# Patient Record
Sex: MALE | Race: ASIAN | NOT HISPANIC OR LATINO | URBAN - METROPOLITAN AREA
[De-identification: names, ages, dates, MRNs, and addresses within clinical notes are randomized per-mention and may not be internally consistent; named-entity substitution may affect disease eponyms.]

---

## 2019-06-03 RX ORDER — LORATADINE 10 MG/1
1 TABLET ORAL DAILY
COMMUNITY
Start: 2015-12-07

## 2019-06-03 RX ORDER — ARIPIPRAZOLE 5 MG/1
1 TABLET ORAL DAILY
COMMUNITY
Start: 2015-12-07

## 2019-06-03 RX ORDER — FLUTICASONE PROPIONATE 50 MCG
2 SPRAY, SUSPENSION (ML) NASAL AS NEEDED
COMMUNITY
Start: 2015-12-07

## 2019-06-03 RX ORDER — CITALOPRAM 20 MG/1
1 TABLET ORAL
COMMUNITY
Start: 2015-12-07 | End: 2019-09-03

## 2019-06-03 RX ORDER — ZOLPIDEM TARTRATE 10 MG/1
10 TABLET ORAL
COMMUNITY
Start: 2015-12-07

## 2019-06-04 ENCOUNTER — OFFICE VISIT (OUTPATIENT)
Dept: FAMILY MEDICINE CLINIC | Facility: CLINIC | Age: 44
End: 2019-06-04
Payer: COMMERCIAL

## 2019-06-04 VITALS
TEMPERATURE: 97.6 F | BODY MASS INDEX: 34.58 KG/M2 | DIASTOLIC BLOOD PRESSURE: 74 MMHG | WEIGHT: 247 LBS | SYSTOLIC BLOOD PRESSURE: 128 MMHG | RESPIRATION RATE: 16 BRPM | HEIGHT: 71 IN | HEART RATE: 74 BPM | OXYGEN SATURATION: 99 %

## 2019-06-04 DIAGNOSIS — R53.83 OTHER FATIGUE: ICD-10-CM

## 2019-06-04 DIAGNOSIS — E66.3 OVERWEIGHT: ICD-10-CM

## 2019-06-04 DIAGNOSIS — E78.2 MIXED HYPERLIPIDEMIA: ICD-10-CM

## 2019-06-04 DIAGNOSIS — E55.9 VITAMIN D DEFICIENCY: ICD-10-CM

## 2019-06-04 DIAGNOSIS — R71.8 MICROCYTIC ERYTHROCYTES: Primary | ICD-10-CM

## 2019-06-04 DIAGNOSIS — E11.9 TYPE 2 DIABETES MELLITUS WITHOUT COMPLICATION, WITHOUT LONG-TERM CURRENT USE OF INSULIN (HCC): ICD-10-CM

## 2019-06-04 DIAGNOSIS — F31.9 BIPOLAR 1 DISORDER (HCC): ICD-10-CM

## 2019-06-04 DIAGNOSIS — F41.9 ANXIETY AND DEPRESSION: ICD-10-CM

## 2019-06-04 DIAGNOSIS — Z23 ENCOUNTER FOR IMMUNIZATION: Primary | ICD-10-CM

## 2019-06-04 DIAGNOSIS — F32.A ANXIETY AND DEPRESSION: ICD-10-CM

## 2019-06-04 DIAGNOSIS — Z78.9 RECENT FOREIGN TRAVEL: ICD-10-CM

## 2019-06-04 PROCEDURE — 99213 OFFICE O/P EST LOW 20 MIN: CPT | Performed by: SPECIALIST

## 2019-06-04 PROCEDURE — 90471 IMMUNIZATION ADMIN: CPT

## 2019-06-04 PROCEDURE — 1036F TOBACCO NON-USER: CPT | Performed by: SPECIALIST

## 2019-06-04 PROCEDURE — 99396 PREV VISIT EST AGE 40-64: CPT | Performed by: SPECIALIST

## 2019-06-04 PROCEDURE — 3008F BODY MASS INDEX DOCD: CPT | Performed by: SPECIALIST

## 2019-06-04 PROCEDURE — 90707 MMR VACCINE SC: CPT

## 2019-06-06 ENCOUNTER — APPOINTMENT (OUTPATIENT)
Dept: LAB | Facility: CLINIC | Age: 44
End: 2019-06-06
Payer: COMMERCIAL

## 2019-06-06 DIAGNOSIS — E78.2 MIXED HYPERLIPIDEMIA: ICD-10-CM

## 2019-06-06 DIAGNOSIS — F32.A ANXIETY AND DEPRESSION: ICD-10-CM

## 2019-06-06 DIAGNOSIS — R53.83 OTHER FATIGUE: ICD-10-CM

## 2019-06-06 DIAGNOSIS — E66.3 OVERWEIGHT: ICD-10-CM

## 2019-06-06 DIAGNOSIS — F31.9 BIPOLAR 1 DISORDER (HCC): ICD-10-CM

## 2019-06-06 DIAGNOSIS — F41.9 ANXIETY AND DEPRESSION: ICD-10-CM

## 2019-06-06 DIAGNOSIS — E55.9 VITAMIN D DEFICIENCY: ICD-10-CM

## 2019-06-06 DIAGNOSIS — R71.8 MICROCYTIC ERYTHROCYTES: ICD-10-CM

## 2019-06-06 DIAGNOSIS — E11.9 TYPE 2 DIABETES MELLITUS WITHOUT COMPLICATION, WITHOUT LONG-TERM CURRENT USE OF INSULIN (HCC): ICD-10-CM

## 2019-06-06 LAB
25(OH)D3 SERPL-MCNC: 17.8 NG/ML (ref 30–100)
ALBUMIN SERPL BCP-MCNC: 3.6 G/DL (ref 3.5–5)
ALP SERPL-CCNC: 76 U/L (ref 46–116)
ALT SERPL W P-5'-P-CCNC: 24 U/L (ref 12–78)
ANION GAP SERPL CALCULATED.3IONS-SCNC: 7 MMOL/L (ref 4–13)
AST SERPL W P-5'-P-CCNC: 13 U/L (ref 5–45)
BASOPHILS # BLD AUTO: 0.03 THOUSANDS/ΜL (ref 0–0.1)
BASOPHILS NFR BLD AUTO: 1 % (ref 0–1)
BILIRUB SERPL-MCNC: 0.63 MG/DL (ref 0.2–1)
BILIRUB UR QL STRIP: NEGATIVE
BUN SERPL-MCNC: 14 MG/DL (ref 5–25)
CALCIUM SERPL-MCNC: 8.5 MG/DL (ref 8.3–10.1)
CHLORIDE SERPL-SCNC: 103 MMOL/L (ref 100–108)
CHOLEST SERPL-MCNC: 241 MG/DL (ref 50–200)
CLARITY UR: CLEAR
CO2 SERPL-SCNC: 27 MMOL/L (ref 21–32)
COLOR UR: YELLOW
CREAT SERPL-MCNC: 1.02 MG/DL (ref 0.6–1.3)
CREAT UR-MCNC: 215 MG/DL
EOSINOPHIL # BLD AUTO: 0.39 THOUSAND/ΜL (ref 0–0.61)
EOSINOPHIL NFR BLD AUTO: 6 % (ref 0–6)
ERYTHROCYTE [DISTWIDTH] IN BLOOD BY AUTOMATED COUNT: 15.7 % (ref 11.6–15.1)
EST. AVERAGE GLUCOSE BLD GHB EST-MCNC: 157 MG/DL
GFR SERPL CREATININE-BSD FRML MDRD: 90 ML/MIN/1.73SQ M
GLUCOSE P FAST SERPL-MCNC: 144 MG/DL (ref 65–99)
GLUCOSE UR STRIP-MCNC: NEGATIVE MG/DL
HBA1C MFR BLD: 7.1 % (ref 4.2–6.3)
HCT VFR BLD AUTO: 46.1 % (ref 36.5–49.3)
HDLC SERPL-MCNC: 38 MG/DL (ref 40–60)
HGB BLD-MCNC: 14.2 G/DL (ref 12–17)
HGB UR QL STRIP.AUTO: NEGATIVE
IMM GRANULOCYTES # BLD AUTO: 0.01 THOUSAND/UL (ref 0–0.2)
IMM GRANULOCYTES NFR BLD AUTO: 0 % (ref 0–2)
KETONES UR STRIP-MCNC: NEGATIVE MG/DL
LDLC SERPL CALC-MCNC: 148 MG/DL (ref 0–100)
LEUKOCYTE ESTERASE UR QL STRIP: NEGATIVE
LYMPHOCYTES # BLD AUTO: 2.75 THOUSANDS/ΜL (ref 0.6–4.47)
LYMPHOCYTES NFR BLD AUTO: 43 % (ref 14–44)
MCH RBC QN AUTO: 24.2 PG (ref 26.8–34.3)
MCHC RBC AUTO-ENTMCNC: 30.8 G/DL (ref 31.4–37.4)
MCV RBC AUTO: 79 FL (ref 82–98)
MICROALBUMIN UR-MCNC: 10.8 MG/L (ref 0–20)
MICROALBUMIN/CREAT 24H UR: 5 MG/G CREATININE (ref 0–30)
MONOCYTES # BLD AUTO: 0.5 THOUSAND/ΜL (ref 0.17–1.22)
MONOCYTES NFR BLD AUTO: 8 % (ref 4–12)
NEUTROPHILS # BLD AUTO: 2.79 THOUSANDS/ΜL (ref 1.85–7.62)
NEUTS SEG NFR BLD AUTO: 42 % (ref 43–75)
NITRITE UR QL STRIP: NEGATIVE
NONHDLC SERPL-MCNC: 203 MG/DL
NRBC BLD AUTO-RTO: 0 /100 WBCS
PH UR STRIP.AUTO: 6 [PH]
PLATELET # BLD AUTO: 261 THOUSANDS/UL (ref 149–390)
PMV BLD AUTO: 12.5 FL (ref 8.9–12.7)
POTASSIUM SERPL-SCNC: 4.1 MMOL/L (ref 3.5–5.3)
PROT SERPL-MCNC: 6.9 G/DL (ref 6.4–8.2)
PROT UR STRIP-MCNC: NEGATIVE MG/DL
RBC # BLD AUTO: 5.87 MILLION/UL (ref 3.88–5.62)
SODIUM SERPL-SCNC: 137 MMOL/L (ref 136–145)
SP GR UR STRIP.AUTO: 1.02 (ref 1–1.03)
TRIGL SERPL-MCNC: 275 MG/DL
TSH SERPL DL<=0.05 MIU/L-ACNC: 1.08 UIU/ML (ref 0.36–3.74)
UROBILINOGEN UR QL STRIP.AUTO: 0.2 E.U./DL
WBC # BLD AUTO: 6.47 THOUSAND/UL (ref 4.31–10.16)

## 2019-06-06 PROCEDURE — 84443 ASSAY THYROID STIM HORMONE: CPT

## 2019-06-06 PROCEDURE — 83036 HEMOGLOBIN GLYCOSYLATED A1C: CPT

## 2019-06-06 PROCEDURE — 80053 COMPREHEN METABOLIC PANEL: CPT

## 2019-06-06 PROCEDURE — 82043 UR ALBUMIN QUANTITATIVE: CPT | Performed by: SPECIALIST

## 2019-06-06 PROCEDURE — 80061 LIPID PANEL: CPT

## 2019-06-06 PROCEDURE — 3061F NEG MICROALBUMINURIA REV: CPT | Performed by: SPECIALIST

## 2019-06-06 PROCEDURE — 82570 ASSAY OF URINE CREATININE: CPT | Performed by: SPECIALIST

## 2019-06-06 PROCEDURE — 36415 COLL VENOUS BLD VENIPUNCTURE: CPT

## 2019-06-06 PROCEDURE — 82306 VITAMIN D 25 HYDROXY: CPT

## 2019-06-06 PROCEDURE — 81003 URINALYSIS AUTO W/O SCOPE: CPT | Performed by: SPECIALIST

## 2019-06-06 PROCEDURE — 85025 COMPLETE CBC W/AUTO DIFF WBC: CPT

## 2019-06-17 DIAGNOSIS — E13.8 DIABETES MELLITUS OF OTHER TYPE WITH COMPLICATION, UNSPECIFIED WHETHER LONG TERM INSULIN USE: Primary | ICD-10-CM

## 2019-07-08 LAB
LEFT EYE DIABETIC RETINOPATHY: NORMAL
RIGHT EYE DIABETIC RETINOPATHY: NORMAL

## 2019-09-03 ENCOUNTER — OFFICE VISIT (OUTPATIENT)
Dept: FAMILY MEDICINE CLINIC | Facility: CLINIC | Age: 44
End: 2019-09-03
Payer: COMMERCIAL

## 2019-09-03 VITALS
DIASTOLIC BLOOD PRESSURE: 80 MMHG | HEIGHT: 71 IN | SYSTOLIC BLOOD PRESSURE: 122 MMHG | OXYGEN SATURATION: 98 % | TEMPERATURE: 98.9 F | WEIGHT: 242 LBS | HEART RATE: 71 BPM | BODY MASS INDEX: 33.88 KG/M2

## 2019-09-03 DIAGNOSIS — E55.9 VITAMIN D DEFICIENCY: ICD-10-CM

## 2019-09-03 DIAGNOSIS — R71.8 MICROCYTIC ERYTHROCYTES: ICD-10-CM

## 2019-09-03 DIAGNOSIS — Z71.84 TRAVEL ADVICE ENCOUNTER: ICD-10-CM

## 2019-09-03 DIAGNOSIS — Z71.84 COUNSELING FOR TRAVEL: Primary | ICD-10-CM

## 2019-09-03 DIAGNOSIS — E78.2 MIXED HYPERLIPIDEMIA: ICD-10-CM

## 2019-09-03 DIAGNOSIS — E13.8 DIABETES MELLITUS OF OTHER TYPE WITH COMPLICATION, UNSPECIFIED WHETHER LONG TERM INSULIN USE: ICD-10-CM

## 2019-09-03 DIAGNOSIS — R53.83 OTHER FATIGUE: ICD-10-CM

## 2019-09-03 PROCEDURE — 99214 OFFICE O/P EST MOD 30 MIN: CPT | Performed by: SPECIALIST

## 2019-09-03 PROCEDURE — 3008F BODY MASS INDEX DOCD: CPT | Performed by: SPECIALIST

## 2019-09-03 RX ORDER — ESCITALOPRAM OXALATE 20 MG/1
20 TABLET ORAL DAILY
COMMUNITY

## 2019-09-03 NOTE — PROGRESS NOTES
Assessment/Plan:    dm2   a1c  7 1%   Cont  Walking     Wt  Reduction    Increase  The  Metformin       Patient seen in office today  During the visit I was accompanied by MA while physical exam was completed  No problem-specific Assessment & Plan notes found for this encounter  Problem List Items Addressed This Visit     None            Subjective:     BMI Counseling: There is no height or weight on file to calculate BMI  Discussed the patient's BMI with him  The BMI is above average  BMI counseling and education was provided to the patient  Nutrition recommendations include decreasing overall calorie intake and increasing intake of lean protein  Depression Screening Follow-up Plan: Patient's depression screening was positive with a PHQ-2 score of   Their PHQ-9 score was   Patient assessed for underlying major depression  They have no active suicidal ideations  Brief counseling provided and recommend additional follow-up/re-evaluation next office visit  Patient ID: Laurent Wilson is a 38 y o  male      52-year-old male who feels well    diabetes type 2 A1c 7 1% average sugar 157      needs to continue weight loss and walking     he is watching the carbohydrates and is aware that he should take 70 g of carbohydrate per meal       also he is going on vacation in [facility] in [facility]    he has had hepatitis a vaccine     time recommended that he take the flu vaccine before he goes      a and I ordered oral typhoid    He tried to get appointment at [facility] travel service several weeks ago in her booked up and also [facility] is booked   precautions given for travel and he is well aware he has traveled before and he follows all the rules regarding washing fruits and vegetables and the type of food he and not to eat    I he will be receiving the flu vaccine at the pharmacy and they will dispense his oral typhoid tablets      See him in 6 months laboratory data ordered for in 6 months    Bipolar disease well controlled      The following portions of the patient's history were reviewed and updated as appropriate: allergies, current medications, past family history, past medical history, past social history, past surgical history and problem list     Review of Systems   Constitutional: Negative for activity change, appetite change, chills, diaphoresis, fatigue and fever  HENT: Negative for congestion, sinus pressure, sinus pain and voice change  Eyes: Negative for visual disturbance  Eye exam no retinopathy by Ophthalmology   Respiratory: Negative for cough, chest tightness, shortness of breath and wheezing  Cardiovascular: Negative for chest pain, palpitations and leg swelling  Gastrointestinal: Negative for abdominal distention, abdominal pain, anal bleeding and blood in stool  Genitourinary: Negative for difficulty urinating  Musculoskeletal: Negative for arthralgias, back pain, gait problem, joint swelling, myalgias, neck pain and neck stiffness  Skin: Negative for color change, pallor and rash  Neurological: Negative for dizziness, tremors, seizures, syncope, facial asymmetry, speech difficulty, weakness, light-headedness, numbness and headaches  Psychiatric/Behavioral: Negative for agitation  Objective: There were no vitals taken for this visit  Physical Exam   Constitutional: He is oriented to person, place, and time  He appears well-developed and well-nourished  No distress  HENT:   Mouth/Throat: No oropharyngeal exudate  Eyes: Pupils are equal, round, and reactive to light  EOM are normal  No scleral icterus  Neck: No JVD present  Cardiovascular: Normal rate, regular rhythm, normal heart sounds and intact distal pulses  No murmur heard  Pulmonary/Chest: Effort normal and breath sounds normal  He has no wheezes  Abdominal: Soft  Musculoskeletal: He exhibits no edema or deformity     Neurological: He is alert and oriented to person, place, and time  Skin: Skin is warm and dry  He is not diaphoretic  Psychiatric: He has a normal mood and affect

## 2019-10-04 DIAGNOSIS — E11.9 TYPE 2 DIABETES MELLITUS WITHOUT COMPLICATION, WITHOUT LONG-TERM CURRENT USE OF INSULIN (HCC): Primary | ICD-10-CM

## 2020-02-02 DIAGNOSIS — E11.9 TYPE 2 DIABETES MELLITUS WITHOUT COMPLICATION, WITHOUT LONG-TERM CURRENT USE OF INSULIN (HCC): ICD-10-CM

## 2020-02-27 DIAGNOSIS — E11.9 TYPE 2 DIABETES MELLITUS WITHOUT COMPLICATION, WITHOUT LONG-TERM CURRENT USE OF INSULIN (HCC): ICD-10-CM

## 2020-03-05 ENCOUNTER — APPOINTMENT (OUTPATIENT)
Dept: LAB | Facility: CLINIC | Age: 45
End: 2020-03-05
Payer: COMMERCIAL

## 2020-03-05 DIAGNOSIS — E55.9 VITAMIN D DEFICIENCY: ICD-10-CM

## 2020-03-05 DIAGNOSIS — E13.8 DIABETES MELLITUS OF OTHER TYPE WITH COMPLICATION, UNSPECIFIED WHETHER LONG TERM INSULIN USE: ICD-10-CM

## 2020-03-05 DIAGNOSIS — Z71.84 TRAVEL ADVICE ENCOUNTER: ICD-10-CM

## 2020-03-05 DIAGNOSIS — R71.8 MICROCYTIC ERYTHROCYTES: ICD-10-CM

## 2020-03-05 DIAGNOSIS — E78.2 MIXED HYPERLIPIDEMIA: ICD-10-CM

## 2020-03-05 LAB
25(OH)D3 SERPL-MCNC: 38.9 NG/ML (ref 30–100)
ALBUMIN SERPL BCP-MCNC: 3.8 G/DL (ref 3.5–5)
ALP SERPL-CCNC: 73 U/L (ref 46–116)
ALT SERPL W P-5'-P-CCNC: 26 U/L (ref 12–78)
ANION GAP SERPL CALCULATED.3IONS-SCNC: 1 MMOL/L (ref 4–13)
AST SERPL W P-5'-P-CCNC: 13 U/L (ref 5–45)
BASOPHILS # BLD AUTO: 0.07 THOUSANDS/ΜL (ref 0–0.1)
BASOPHILS NFR BLD AUTO: 1 % (ref 0–1)
BILIRUB SERPL-MCNC: 0.52 MG/DL (ref 0.2–1)
BILIRUB UR QL STRIP: NEGATIVE
BUN SERPL-MCNC: 13 MG/DL (ref 5–25)
CALCIUM SERPL-MCNC: 8.8 MG/DL (ref 8.3–10.1)
CHLORIDE SERPL-SCNC: 105 MMOL/L (ref 100–108)
CHOLEST SERPL-MCNC: 241 MG/DL (ref 50–200)
CLARITY UR: CLEAR
CO2 SERPL-SCNC: 29 MMOL/L (ref 21–32)
COLOR UR: YELLOW
CREAT SERPL-MCNC: 1.02 MG/DL (ref 0.6–1.3)
EOSINOPHIL # BLD AUTO: 0.32 THOUSAND/ΜL (ref 0–0.61)
EOSINOPHIL NFR BLD AUTO: 5 % (ref 0–6)
ERYTHROCYTE [DISTWIDTH] IN BLOOD BY AUTOMATED COUNT: 15.9 % (ref 11.6–15.1)
EST. AVERAGE GLUCOSE BLD GHB EST-MCNC: 148 MG/DL
GFR SERPL CREATININE-BSD FRML MDRD: 90 ML/MIN/1.73SQ M
GLUCOSE P FAST SERPL-MCNC: 115 MG/DL (ref 65–99)
GLUCOSE UR STRIP-MCNC: NEGATIVE MG/DL
HBA1C MFR BLD: 6.8 %
HCT VFR BLD AUTO: 46.4 % (ref 36.5–49.3)
HDLC SERPL-MCNC: 37 MG/DL
HGB BLD-MCNC: 14.1 G/DL (ref 12–17)
HGB UR QL STRIP.AUTO: NEGATIVE
IMM GRANULOCYTES # BLD AUTO: 0.01 THOUSAND/UL (ref 0–0.2)
IMM GRANULOCYTES NFR BLD AUTO: 0 % (ref 0–2)
KETONES UR STRIP-MCNC: NEGATIVE MG/DL
LDLC SERPL CALC-MCNC: 155 MG/DL (ref 0–100)
LEUKOCYTE ESTERASE UR QL STRIP: NEGATIVE
LYMPHOCYTES # BLD AUTO: 2.74 THOUSANDS/ΜL (ref 0.6–4.47)
LYMPHOCYTES NFR BLD AUTO: 45 % (ref 14–44)
MCH RBC QN AUTO: 23.6 PG (ref 26.8–34.3)
MCHC RBC AUTO-ENTMCNC: 30.4 G/DL (ref 31.4–37.4)
MCV RBC AUTO: 78 FL (ref 82–98)
MONOCYTES # BLD AUTO: 0.39 THOUSAND/ΜL (ref 0.17–1.22)
MONOCYTES NFR BLD AUTO: 6 % (ref 4–12)
NEUTROPHILS # BLD AUTO: 2.62 THOUSANDS/ΜL (ref 1.85–7.62)
NEUTS SEG NFR BLD AUTO: 43 % (ref 43–75)
NITRITE UR QL STRIP: NEGATIVE
NONHDLC SERPL-MCNC: 204 MG/DL
NRBC BLD AUTO-RTO: 0 /100 WBCS
PH UR STRIP.AUTO: 6 [PH]
PLATELET # BLD AUTO: 246 THOUSANDS/UL (ref 149–390)
PMV BLD AUTO: 12.1 FL (ref 8.9–12.7)
POTASSIUM SERPL-SCNC: 3.8 MMOL/L (ref 3.5–5.3)
PROT SERPL-MCNC: 7.3 G/DL (ref 6.4–8.2)
PROT UR STRIP-MCNC: NEGATIVE MG/DL
RBC # BLD AUTO: 5.97 MILLION/UL (ref 3.88–5.62)
SODIUM SERPL-SCNC: 135 MMOL/L (ref 136–145)
SP GR UR STRIP.AUTO: 1.02 (ref 1–1.03)
TRIGL SERPL-MCNC: 243 MG/DL
UROBILINOGEN UR QL STRIP.AUTO: 0.2 E.U./DL
WBC # BLD AUTO: 6.15 THOUSAND/UL (ref 4.31–10.16)

## 2020-03-05 PROCEDURE — 80061 LIPID PANEL: CPT

## 2020-03-05 PROCEDURE — 80053 COMPREHEN METABOLIC PANEL: CPT

## 2020-03-05 PROCEDURE — 83036 HEMOGLOBIN GLYCOSYLATED A1C: CPT

## 2020-03-05 PROCEDURE — 82306 VITAMIN D 25 HYDROXY: CPT

## 2020-03-05 PROCEDURE — 81003 URINALYSIS AUTO W/O SCOPE: CPT | Performed by: SPECIALIST

## 2020-03-05 PROCEDURE — 36415 COLL VENOUS BLD VENIPUNCTURE: CPT

## 2020-03-05 PROCEDURE — 3044F HG A1C LEVEL LT 7.0%: CPT | Performed by: SPECIALIST

## 2020-03-05 PROCEDURE — 85025 COMPLETE CBC W/AUTO DIFF WBC: CPT

## 2020-03-29 DIAGNOSIS — E11.9 TYPE 2 DIABETES MELLITUS WITHOUT COMPLICATION, WITHOUT LONG-TERM CURRENT USE OF INSULIN (HCC): ICD-10-CM

## 2020-04-27 DIAGNOSIS — E11.9 TYPE 2 DIABETES MELLITUS WITHOUT COMPLICATION, WITHOUT LONG-TERM CURRENT USE OF INSULIN (HCC): ICD-10-CM

## 2020-06-20 DIAGNOSIS — E11.9 TYPE 2 DIABETES MELLITUS WITHOUT COMPLICATION, WITHOUT LONG-TERM CURRENT USE OF INSULIN (HCC): ICD-10-CM

## 2020-07-16 DIAGNOSIS — E11.9 TYPE 2 DIABETES MELLITUS WITHOUT COMPLICATION, WITHOUT LONG-TERM CURRENT USE OF INSULIN (HCC): ICD-10-CM

## 2020-08-05 RX ORDER — CITALOPRAM 20 MG/1
1 TABLET ORAL
COMMUNITY
Start: 2015-12-07 | End: 2020-11-17 | Stop reason: ALTCHOICE

## 2020-08-09 DIAGNOSIS — E11.9 TYPE 2 DIABETES MELLITUS WITHOUT COMPLICATION, WITHOUT LONG-TERM CURRENT USE OF INSULIN (HCC): ICD-10-CM

## 2020-08-10 ENCOUNTER — OFFICE VISIT (OUTPATIENT)
Dept: FAMILY MEDICINE CLINIC | Facility: CLINIC | Age: 45
End: 2020-08-10
Payer: COMMERCIAL

## 2020-08-10 ENCOUNTER — TELEPHONE (OUTPATIENT)
Dept: FAMILY MEDICINE CLINIC | Facility: CLINIC | Age: 45
End: 2020-08-10

## 2020-08-10 VITALS
SYSTOLIC BLOOD PRESSURE: 144 MMHG | HEIGHT: 71 IN | OXYGEN SATURATION: 98 % | BODY MASS INDEX: 34.3 KG/M2 | DIASTOLIC BLOOD PRESSURE: 70 MMHG | HEART RATE: 65 BPM | WEIGHT: 245 LBS

## 2020-08-10 DIAGNOSIS — E78.2 MIXED HYPERLIPIDEMIA: ICD-10-CM

## 2020-08-10 DIAGNOSIS — E11.9 TYPE 2 DIABETES MELLITUS WITHOUT COMPLICATION, WITHOUT LONG-TERM CURRENT USE OF INSULIN (HCC): ICD-10-CM

## 2020-08-10 DIAGNOSIS — E55.9 VITAMIN D DEFICIENCY: ICD-10-CM

## 2020-08-10 DIAGNOSIS — F32.A ANXIETY AND DEPRESSION: ICD-10-CM

## 2020-08-10 DIAGNOSIS — F41.9 ANXIETY AND DEPRESSION: ICD-10-CM

## 2020-08-10 DIAGNOSIS — R71.8 MICROCYTIC ERYTHROCYTES: ICD-10-CM

## 2020-08-10 DIAGNOSIS — E66.3 OVERWEIGHT: ICD-10-CM

## 2020-08-10 DIAGNOSIS — E11.9 TYPE 2 DIABETES MELLITUS WITHOUT COMPLICATION, WITHOUT LONG-TERM CURRENT USE OF INSULIN (HCC): Primary | ICD-10-CM

## 2020-08-10 PROCEDURE — 99214 OFFICE O/P EST MOD 30 MIN: CPT | Performed by: SPECIALIST

## 2020-08-10 PROCEDURE — 3725F SCREEN DEPRESSION PERFORMED: CPT | Performed by: SPECIALIST

## 2020-08-10 PROCEDURE — 2022F DILAT RTA XM EVC RTNOPTHY: CPT | Performed by: SPECIALIST

## 2020-08-10 PROCEDURE — 3008F BODY MASS INDEX DOCD: CPT | Performed by: SPECIALIST

## 2020-08-10 PROCEDURE — 3044F HG A1C LEVEL LT 7.0%: CPT | Performed by: SPECIALIST

## 2020-08-10 PROCEDURE — 1036F TOBACCO NON-USER: CPT | Performed by: SPECIALIST

## 2020-08-10 RX ORDER — FLASH GLUCOSE SCANNING READER
EACH MISCELLANEOUS
Qty: 1 DEVICE | Refills: 0 | Status: SHIPPED | OUTPATIENT
Start: 2020-08-10 | End: 2022-02-10

## 2020-08-10 RX ORDER — FLASH GLUCOSE SENSOR
1 KIT MISCELLANEOUS
Qty: 4 EACH | Refills: 6 | Status: SHIPPED | OUTPATIENT
Start: 2020-08-10 | End: 2020-08-10

## 2020-08-10 RX ORDER — FLASH GLUCOSE SENSOR
1 KIT MISCELLANEOUS
Qty: 1 EACH | Refills: 6 | Status: SHIPPED | OUTPATIENT
Start: 2020-08-10 | End: 2022-02-10

## 2020-08-10 RX ORDER — ROSUVASTATIN CALCIUM 5 MG/1
5 TABLET, COATED ORAL DAILY
Qty: 90 TABLET | Refills: 3 | Status: SHIPPED | OUTPATIENT
Start: 2020-08-10 | End: 2021-07-13

## 2020-08-10 NOTE — PROGRESS NOTES
Assessment/Plan:      43-year-old male with diabetes mellitus type 2 hyperlipidemia had blood work done there has been improvement in his A1c now  6 8  %    average sugar 148 fasting blood sugar was 115 so I suspect that the 2 hour postprandial sugars are too high I recommend Aleve peripheries for continuous monitoring which will also help with his diet at to tele how sugars are before and after eating      Referred to diabetic Education    Continue using the treadmill 3 times a week      He has history of vitamin-D deficiency was 17 8 corrected to 38 5    He has hyperlipidemia Crestor 5 mg added at bedtime cholesterol was 241 triglyceride 243 L DL was 155      Otherwise stable he does need flu vaccine in the fall and he has schedule does at the pharmacy with a secondary follow-up for hepatitis a vaccine      Patient seen in office today  During the visit I was accompanied by MA while physical exam was completed  No problem-specific Assessment & Plan notes found for this encounter           Problem List Items Addressed This Visit     None      Visit Diagnoses     Type 2 diabetes mellitus without complication, without long-term current use of insulin (HCC)    -  Primary    Relevant Medications    Continuous Blood Gluc Sensor (FreeStyle Geraldine 14 Day Sensor) MISC    Continuous Blood Gluc  (FreeStyle Geraldine 14 Day Wilmot) TINO    Other Relevant Orders    CBC and differential    Comprehensive metabolic panel    HEMOGLOBIN A1C W/ EAG ESTIMATION    Lipid panel    Magnesium    Microalbumin / creatinine urine ratio    UA w Reflex to Microscopic w Reflex to Culture -Lab Collect    Ambulatory referral to Diabetic Education    Microcytic erythrocytes        Relevant Orders    CBC and differential    Comprehensive metabolic panel    HEMOGLOBIN A1C W/ EAG ESTIMATION    Lipid panel    Magnesium    Microalbumin / creatinine urine ratio    UA w Reflex to Microscopic w Reflex to Culture -Lab Collect    Mixed hyperlipidemia Relevant Medications    rosuvastatin (CRESTOR) 5 mg tablet    Continuous Blood Gluc Sensor (FreeStyle Geraldine 14 Day Sensor) MISC    Continuous Blood Gluc  (FreeStyle Hodges 14 Day Converse) TINO    Other Relevant Orders    CBC and differential    Comprehensive metabolic panel    HEMOGLOBIN A1C W/ EAG ESTIMATION    Lipid panel    Magnesium    Microalbumin / creatinine urine ratio    UA w Reflex to Microscopic w Reflex to Culture -Lab Collect    Ambulatory referral to Diabetic Education    Vitamin D deficiency        Relevant Orders    CBC and differential    Comprehensive metabolic panel    HEMOGLOBIN A1C W/ EAG ESTIMATION    Lipid panel    Magnesium    Microalbumin / creatinine urine ratio    UA w Reflex to Microscopic w Reflex to Culture -Lab Collect    Overweight        Relevant Medications    Continuous Blood Gluc Sensor (FreeStyle Geraldine 14 Day Sensor) MISC    Continuous Blood Gluc  (FreeStyle Hodges 14 Day Converse) TINO    Other Relevant Orders    CBC and differential    Comprehensive metabolic panel    HEMOGLOBIN A1C W/ EAG ESTIMATION    Lipid panel    Magnesium    Microalbumin / creatinine urine ratio    UA w Reflex to Microscopic w Reflex to Culture -Lab Collect    Anxiety and depression        Relevant Orders    CBC and differential    Comprehensive metabolic panel    HEMOGLOBIN A1C W/ EAG ESTIMATION    Lipid panel    Magnesium    Microalbumin / creatinine urine ratio    UA w Reflex to Microscopic w Reflex to Culture -Lab Collect            Subjective:            Patient ID: Isaias De Luna is a 41 y o  male      60-year-old male with diabetes mellitus hyperlipidemia referring him to diabetic Education in dietitian I am adding Crestor 5 mg at bedtime since his cholesterol was fairly elevated at 241 and he has been using the treadmill 3 times a week also thinks that he would do very well with a continuous glucose monitor to help him with his diet and exercise so he can see how his sugars respond to different types of meals      He with he will be getting flu vaccine in the fall as well as hepatitis a vaccine a 2nd dose      The following portions of the patient's history were reviewed and updated as appropriate: allergies, past family history, past medical history and past surgical history  Review of Systems   Constitutional: Negative for activity change, appetite change, chills, diaphoresis, fatigue and fever  HENT: Negative for congestion, sinus pressure, sinus pain and voice change  Eyes: Negative for visual disturbance  Has seen the eye doctor no retinal disease   Respiratory: Negative for chest tightness, shortness of breath, wheezing and stridor  Has seasonal allergies nonsmoker   Cardiovascular: Negative for chest pain, palpitations and leg swelling  Gastrointestinal: Negative for abdominal distention  Genitourinary: Negative for difficulty urinating  Musculoskeletal: Positive for arthralgias  Negative for back pain, gait problem, joint swelling, myalgias, neck pain and neck stiffness  Skin: Negative  Neurological: Negative for dizziness, tremors, seizures, syncope, facial asymmetry, speech difficulty, weakness, light-headedness, numbness and headaches  Objective:      /70 (BP Location: Right arm, Patient Position: Sitting, Cuff Size: Standard)   Pulse 65   Ht 5' 11" (1 803 m)   Wt 111 kg (245 lb)   SpO2 98%   BMI 34 17 kg/m²          Physical Exam  Constitutional:       Appearance: Normal appearance  Comments: Overweight   Eyes:      General: No scleral icterus  Extraocular Movements: Extraocular movements intact  Pupils: Pupils are equal, round, and reactive to light  Neck:      Musculoskeletal: Normal range of motion  Cardiovascular:      Rate and Rhythm: Normal rate and regular rhythm  Pulses: Normal pulses  Heart sounds: Normal heart sounds  No murmur     Pulmonary:      Effort: Pulmonary effort is normal  No respiratory distress  Breath sounds: No wheezing or rales  Abdominal:      General: Abdomen is flat  Palpations: Abdomen is soft  Skin:     General: Skin is warm and dry  Neurological:      General: No focal deficit present  Mental Status: He is alert     Psychiatric:         Mood and Affect: Mood normal

## 2020-08-10 NOTE — PROGRESS NOTES
Diabetic Foot Exam    Diabetic Foot Exam     He has normal vibratory normal pulses skin is intact vibratory extinction is normal light touch is normal no evidence of peripheral neuropathy or vascular insufficiency

## 2020-08-11 ENCOUNTER — TELEMEDICINE (OUTPATIENT)
Dept: DIABETES SERVICES | Facility: HOSPITAL | Age: 45
End: 2020-08-11
Payer: COMMERCIAL

## 2020-08-11 VITALS — WEIGHT: 245 LBS | BODY MASS INDEX: 34.3 KG/M2 | HEIGHT: 71 IN

## 2020-08-11 DIAGNOSIS — E11.9 TYPE 2 DIABETES MELLITUS WITHOUT COMPLICATION, WITHOUT LONG-TERM CURRENT USE OF INSULIN (HCC): Primary | ICD-10-CM

## 2020-08-11 PROCEDURE — 3008F BODY MASS INDEX DOCD: CPT | Performed by: SPECIALIST

## 2020-08-11 PROCEDURE — G0108 DIAB MANAGE TRN  PER INDIV: HCPCS | Performed by: DIETITIAN, REGISTERED

## 2020-08-11 NOTE — PROGRESS NOTES
Virtual Regular Visit      Assessment/Plan:    Problem List Items Addressed This Visit     None               Reason for visit is   Chief Complaint   Patient presents with    Diabetes Type 2    Virtual Regular Visit        Encounter provider Dilma Busby RD    Provider located at [place] [address] [address] [place] [address]      Recent Visits  Date Type Provider Dept   08/10/20 [facility]   08/10/20 Office Visit Maya Nguyễn MD Pg  Primary [facility]   Showing recent visits within past 7 days and meeting all other requirements     Today's Visits  Date Type Provider Dept   08/11/20 Telemedicine Dilma Busby RD Pg Diabetic Education [place] today's visits and meeting all other requirements     Future Appointments  No visits were found meeting these conditions  Showing future appointments within next 150 days and meeting all other requirements        The patient was identified by name and date of birth  Yesi Morales was informed that this is a telemedicine visit and that the visit is being conducted through [employer]  My office door was closed  No one else was in the room  He acknowledged consent and understanding of privacy and security of the video platform  The patient has agreed to participate and understands they can discontinue the visit at any time  Patient is aware this is a billable service  Type 2 Diabetes Class Assessment    Met with Yesi Morales for DSME Follow-up visit  Telly has Type 2 Diabetes  Referral received for diabetes classes and medical nutrition therapy, he would like to learn as much as he can about managing his diabetes I would like to attend our classes, likely complete medical to nutrition therapy afterwards  Diabetes assessment completed today    I encouraged him to reach out to his family doctor to get a prescription for a meter and testing supplies as this is the foundation of diabetes management, he will do so  I asked him to start testing twice a day as pair testing  He will call us to schedule virtual classes  Diabetes Assessment  Visit Type: Initial visit  Present at Session: patient   Medical Diagnosis/ICD 10: E11 9  Special Learning Needs: No  Barriers to Learning: no barriers    How do you feel about making lifestyle changes at this time? ready  How would you rate your current knowledge of diabetes?: fair  How confident are you that will be able to take better control of your diabetes?: good    How long have you had diabetes? New dx   Have you had diabetes education in the past?: No  Do you have any family members with diabetes?: No  Do you monitor your blood sugar? no    Any financial concerns pertaining to your diabetes supplies, medication or care?: No  Have you ever experienced hypoglycemia?:  Yes  Have you ever been hospitalized or gone to the ER due to your blood sugars?: No  How do you treat low blood sugars?: drink something sweet or juice , once felt low   How do you treat high blood sugars?  Eat a cracker  Do you wear a Diabetes I D ?: no  Where do you dispose of your sharps (needles,lancetes)?: none    Ht Readings from Last 1 Encounters:   08/11/20 5' 11" (1 803 m)     Wt Readings from Last 2 Encounters:   08/11/20 111 kg (245 lb)   08/10/20 111 kg (245 lb)     Weight Change: No    Diet Assessment  Do you follow any special diet presently?: Yes - decreasing carbs  Who cooks at home?:  patient  Who does the grocery shopping?: patient     Activity Assessment  Exercise: starting to use the treadmill and work out more 3 5miles per hour for 30-40min     Lifestyle/Social Assessment    Racial/ethnic group:                                       Primary Language: English  Marital Status: Single  Work status: Unemployed  Type of job and hours: out of work  Who lives in your household?: parent(s)  Who is you primary support person(s): parent(s)   Describe your quality of life currently?: good  Any concerns for your safety?: No  Any Amish or cultural practices that may affect your diabetes care: No  Do you have a decrease or loss of hearing: No  Do you have a decrease or loss of vision?: No has macular degeneration for age, had a dm exam 2019  When was the last time you had an ophthalmology exam?: 2019  When was the last time you had dental exam?:  Dentist over due from covid 2019 fall   Do you check your feet for cracks, sores, debris?: No  When was the last time you had podiatry or foot exam?: pcp looked at them  Last flu shot?: sept 2019  Pneumonia shot?: none       Lab Results   Component Value Date    HGBA1C 6 8 (H) 03/05/2020     Lab Results   Component Value Date    HDL 37 (L) 03/05/2020    [address] 155 (H) 03/05/2020    TRIG 243 (H) 03/05/2020     No results found for: [facility]      The patient's history was reviewed and updated as appropriate: allergies, current medications  Intervention    Diabetes Overview :   Katherne Landau was instructed on basic concepts of diabetes, including identifying role of diabetes self management, basic pathophysiology and types of diabetes, A1c and blood sugar targets  Telly has good understanding of material covered  Taking Medications: Instructed patient on action, side effects, efficacy, prescribed dosage and appropriate timing and frequency of administration of his diabetes medication  Telly has good understanding of material covered  Monitoring Blood Sugars  Instructions for Meter Teaching- Patient instructed in the following:  Site selection and skin preparation, Loading strips and lancet device, meter activation, obtaining blood sample, test strip and lancet disposal and recording log book entries  Patient has good understanding of material covered    Testing frequency: Encouraged pair testing   Test sugars before a meal and 2hr after the same meal, rotating between breakfast, lunch, and dinner  Test sugars twice a day (3 days a week or 7 days a week)  Goal Blood Sugars:   Premeal , even better <110  2hr after a meal <180, even better <140  A1C <7%, even better <6 5%  Hypoglycemia: Instructed patient on definition/risk of hypoglycemia, treatment, causes/symptoms, when to notify provider of lows, prevention of hypoglycemia and exercise precautions  Comments: Telly verbalizes understanding of hypoglycemia concepts      Physical Activity: Discussed benefits of physical activity to optimize blood glucose control, encouraged activity at patient is physically able  Always consult a physician prior to starting an exercise program   Comments: Telly verbalizes understanding of hypoglycemia concepts          Diabetes Education Record    Telly was given our assessment packet, which includes: Know Your Numbers, Support Group Schedule, Hypoglycemia Sheet, Blood Sugar Log Record Sheet, Sharps Disposal, and Nutrition Guidelines for Diabetes  - sent via mail      Patient response to instruction    Comprehensiongood  Motivationgood  Expected Compliancegood  Referring Provider: Rosemary Markham    Thank you for referring your patient to [facility], it was a pleasure working with them today  Please feel free to call with any questions or concerns  Dilip Sidhu, RD  [address] [phone]      Subjective  Spring Terrell is a 41 y o  male see notes above   HPI     Past Medical History:   Diagnosis Date    Allergic     Anxiety     Bipolar affective disorder [facility])     sees Dr Michelle Landis Depression        No past surgical history on file      Current Outpatient Medications   Medication Sig Dispense Refill    metFORMIN (GLUCOPHAGE) 500 mg tablet TAKE 1 TABLET BY MOUTH TWICE A DAY WITH MEALS (Patient taking differently: One with breakfast, 2 with dinner) 60 tablet 0    ARIPiprazole (ABILIFY) 5 mg tablet Take 1 tablet by mouth daily  citalopram (CeleXA) 20 mg tablet 1 tablet      Continuous Blood Gluc  (FreeStyle Geraldine 14 Day Electra) TINO Use  Daily  To  Monitor  sugars 1 Device 0    Continuous Blood Gluc Sensor (FreeStyle Geraldine 14 Day Sensor) MISC 1 PATCH BY DOES NOT APPLY ROUTE EVERY 14 (FOURTEEN) DAYS 1 each 6    escitalopram (LEXAPRO) 20 mg tablet Take 20 mg by mouth daily      fluticasone (FLONASE) 50 mcg/act nasal spray 2 sprays into each nostril daily       loratadine (CLARITIN) 10 mg tablet Take 1 tablet by mouth daily       rosuvastatin (CRESTOR) 5 mg tablet Take 1 tablet (5 mg total) by mouth daily 90 tablet 3    typhoid live vaccine (VIVOTIF) DR capsule Take one tab PO day 0, 2, 4 & day 6 4 capsule 0    zolpidem (AMBIEN) 10 mg tablet Take 10 mg by mouth daily at bedtime        No current facility-administered medications for this visit  Allergies   Allergen Reactions    No Known Allergies        Review of Systems    Video Exam    Vitals:    08/11/20 1310   Weight: 111 kg (245 lb)   Height: 5' 11" (1 803 m)       Physical Exam     I spent 60 minutes with patient today in which greater than 50% of the time was spent in counseling/coordination of care regarding diabetes      VIRTUAL VISIT DISCLAIMER    Ashish Leeanne Sacks acknowledges that he has consented to an online visit or consultation  He understands that the online visit is based solely on information provided by him, and that, in the absence of a face-to-face physical evaluation by the physician, the diagnosis he receives is both limited and provisional in terms of accuracy and completeness  This is not intended to replace a full medical face-to-face evaluation by the physician  Katie Segal understands and accepts these terms

## 2020-08-12 ENCOUNTER — TELEPHONE (OUTPATIENT)
Dept: FAMILY MEDICINE CLINIC | Facility: CLINIC | Age: 45
End: 2020-08-12

## 2020-08-12 DIAGNOSIS — E11.9 TYPE 2 DIABETES MELLITUS WITHOUT COMPLICATION, WITHOUT LONG-TERM CURRENT USE OF INSULIN (HCC): Primary | ICD-10-CM

## 2020-08-12 NOTE — TELEPHONE ENCOUNTER
Patient called and stated that the [place] isn't covered by his insurance  He was told that he should get a Glucose meter and test strips to test his blood 2 times daily    [facility] [address] [facility]

## 2020-08-14 ENCOUNTER — TELEPHONE (OUTPATIENT)
Dept: FAMILY MEDICINE CLINIC | Facility: CLINIC | Age: 45
End: 2020-08-14

## 2020-08-14 DIAGNOSIS — E11.9 TYPE 2 DIABETES MELLITUS WITHOUT COMPLICATION, WITHOUT LONG-TERM CURRENT USE OF INSULIN (HCC): Primary | ICD-10-CM

## 2020-08-17 RX ORDER — BLOOD-GLUCOSE METER
EACH MISCELLANEOUS DAILY
Qty: 1 EACH | Refills: 0 | Status: SHIPPED | OUTPATIENT
Start: 2020-08-17

## 2020-08-21 DIAGNOSIS — E11.9 TYPE 2 DIABETES MELLITUS WITHOUT COMPLICATION, WITHOUT LONG-TERM CURRENT USE OF INSULIN (HCC): ICD-10-CM

## 2020-10-07 ENCOUNTER — TELEMEDICINE (OUTPATIENT)
Dept: DIABETES SERVICES | Facility: CLINIC | Age: 45
End: 2020-10-07
Payer: COMMERCIAL

## 2020-10-07 DIAGNOSIS — E11.9 TYPE 2 DIABETES MELLITUS WITHOUT COMPLICATION, WITHOUT LONG-TERM CURRENT USE OF INSULIN (HCC): Primary | ICD-10-CM

## 2020-10-07 PROCEDURE — G0109 DIAB MANAGE TRN IND/GROUP: HCPCS | Performed by: DIETITIAN, REGISTERED

## 2020-10-09 RX ORDER — CETIRIZINE HYDROCHLORIDE 5 MG/1
5 TABLET ORAL DAILY
COMMUNITY
Start: 2020-03-01

## 2020-10-14 ENCOUNTER — TELEMEDICINE (OUTPATIENT)
Dept: DIABETES SERVICES | Facility: CLINIC | Age: 45
End: 2020-10-14
Payer: COMMERCIAL

## 2020-10-14 DIAGNOSIS — E11.9 TYPE 2 DIABETES MELLITUS WITHOUT COMPLICATION, WITHOUT LONG-TERM CURRENT USE OF INSULIN (HCC): Primary | ICD-10-CM

## 2020-10-14 PROCEDURE — G0109 DIAB MANAGE TRN IND/GROUP: HCPCS | Performed by: DIETITIAN, REGISTERED

## 2020-10-18 DIAGNOSIS — E11.9 TYPE 2 DIABETES MELLITUS WITHOUT COMPLICATION, WITHOUT LONG-TERM CURRENT USE OF INSULIN (HCC): ICD-10-CM

## 2020-10-21 ENCOUNTER — TELEMEDICINE (OUTPATIENT)
Dept: DIABETES SERVICES | Facility: CLINIC | Age: 45
End: 2020-10-21
Payer: COMMERCIAL

## 2020-10-21 DIAGNOSIS — E11.9 TYPE 2 DIABETES MELLITUS WITHOUT COMPLICATION, WITHOUT LONG-TERM CURRENT USE OF INSULIN (HCC): Primary | ICD-10-CM

## 2020-10-21 PROCEDURE — G0109 DIAB MANAGE TRN IND/GROUP: HCPCS | Performed by: DIETITIAN, REGISTERED

## 2020-10-28 ENCOUNTER — TELEMEDICINE (OUTPATIENT)
Dept: DIABETES SERVICES | Facility: CLINIC | Age: 45
End: 2020-10-28
Payer: COMMERCIAL

## 2020-10-28 DIAGNOSIS — E11.9 TYPE 2 DIABETES MELLITUS WITHOUT COMPLICATION, WITHOUT LONG-TERM CURRENT USE OF INSULIN (HCC): Primary | ICD-10-CM

## 2020-10-28 PROCEDURE — G0109 DIAB MANAGE TRN IND/GROUP: HCPCS | Performed by: DIETITIAN, REGISTERED

## 2020-11-09 ENCOUNTER — APPOINTMENT (OUTPATIENT)
Dept: LAB | Facility: CLINIC | Age: 45
End: 2020-11-09
Payer: COMMERCIAL

## 2020-11-09 DIAGNOSIS — E11.9 TYPE 2 DIABETES MELLITUS WITHOUT COMPLICATION, WITHOUT LONG-TERM CURRENT USE OF INSULIN (HCC): ICD-10-CM

## 2020-11-09 DIAGNOSIS — E55.9 VITAMIN D DEFICIENCY: ICD-10-CM

## 2020-11-09 DIAGNOSIS — R71.8 MICROCYTIC ERYTHROCYTES: ICD-10-CM

## 2020-11-09 DIAGNOSIS — F41.9 ANXIETY AND DEPRESSION: ICD-10-CM

## 2020-11-09 DIAGNOSIS — F32.A ANXIETY AND DEPRESSION: ICD-10-CM

## 2020-11-09 DIAGNOSIS — E66.3 OVERWEIGHT: ICD-10-CM

## 2020-11-09 DIAGNOSIS — E78.2 MIXED HYPERLIPIDEMIA: ICD-10-CM

## 2020-11-09 LAB
ALBUMIN SERPL BCP-MCNC: 3.8 G/DL (ref 3.5–5)
ALP SERPL-CCNC: 72 U/L (ref 46–116)
ALT SERPL W P-5'-P-CCNC: 33 U/L (ref 12–78)
ANION GAP SERPL CALCULATED.3IONS-SCNC: 4 MMOL/L (ref 4–13)
AST SERPL W P-5'-P-CCNC: 15 U/L (ref 5–45)
BASOPHILS # BLD AUTO: 0.07 THOUSANDS/ΜL (ref 0–0.1)
BASOPHILS NFR BLD AUTO: 1 % (ref 0–1)
BILIRUB SERPL-MCNC: 0.6 MG/DL (ref 0.2–1)
BILIRUB UR QL STRIP: NEGATIVE
BUN SERPL-MCNC: 9 MG/DL (ref 5–25)
CALCIUM SERPL-MCNC: 9.2 MG/DL (ref 8.3–10.1)
CHLORIDE SERPL-SCNC: 104 MMOL/L (ref 100–108)
CHOLEST SERPL-MCNC: 161 MG/DL (ref 50–200)
CLARITY UR: CLEAR
CO2 SERPL-SCNC: 29 MMOL/L (ref 21–32)
COLOR UR: YELLOW
CREAT SERPL-MCNC: 0.88 MG/DL (ref 0.6–1.3)
CREAT UR-MCNC: 136 MG/DL
EOSINOPHIL # BLD AUTO: 0.37 THOUSAND/ΜL (ref 0–0.61)
EOSINOPHIL NFR BLD AUTO: 6 % (ref 0–6)
ERYTHROCYTE [DISTWIDTH] IN BLOOD BY AUTOMATED COUNT: 16 % (ref 11.6–15.1)
EST. AVERAGE GLUCOSE BLD GHB EST-MCNC: 143 MG/DL
GFR SERPL CREATININE-BSD FRML MDRD: 104 ML/MIN/1.73SQ M
GLUCOSE P FAST SERPL-MCNC: 119 MG/DL (ref 65–99)
GLUCOSE UR STRIP-MCNC: NEGATIVE MG/DL
HBA1C MFR BLD: 6.6 %
HCT VFR BLD AUTO: 46.9 % (ref 36.5–49.3)
HDLC SERPL-MCNC: 47 MG/DL
HGB BLD-MCNC: 14.3 G/DL (ref 12–17)
HGB UR QL STRIP.AUTO: NEGATIVE
IMM GRANULOCYTES # BLD AUTO: 0.01 THOUSAND/UL (ref 0–0.2)
IMM GRANULOCYTES NFR BLD AUTO: 0 % (ref 0–2)
KETONES UR STRIP-MCNC: NEGATIVE MG/DL
LDLC SERPL CALC-MCNC: 77 MG/DL (ref 0–100)
LEUKOCYTE ESTERASE UR QL STRIP: NEGATIVE
LYMPHOCYTES # BLD AUTO: 2.16 THOUSANDS/ΜL (ref 0.6–4.47)
LYMPHOCYTES NFR BLD AUTO: 35 % (ref 14–44)
MAGNESIUM SERPL-MCNC: 2.2 MG/DL (ref 1.6–2.6)
MCH RBC QN AUTO: 24.4 PG (ref 26.8–34.3)
MCHC RBC AUTO-ENTMCNC: 30.5 G/DL (ref 31.4–37.4)
MCV RBC AUTO: 80 FL (ref 82–98)
MICROALBUMIN UR-MCNC: 7.6 MG/L (ref 0–20)
MICROALBUMIN/CREAT 24H UR: 6 MG/G CREATININE (ref 0–30)
MONOCYTES # BLD AUTO: 0.41 THOUSAND/ΜL (ref 0.17–1.22)
MONOCYTES NFR BLD AUTO: 7 % (ref 4–12)
NEUTROPHILS # BLD AUTO: 3.2 THOUSANDS/ΜL (ref 1.85–7.62)
NEUTS SEG NFR BLD AUTO: 51 % (ref 43–75)
NITRITE UR QL STRIP: NEGATIVE
NONHDLC SERPL-MCNC: 114 MG/DL
NRBC BLD AUTO-RTO: 0 /100 WBCS
PH UR STRIP.AUTO: 6 [PH]
PLATELET # BLD AUTO: 249 THOUSANDS/UL (ref 149–390)
PMV BLD AUTO: 11.5 FL (ref 8.9–12.7)
POTASSIUM SERPL-SCNC: 4 MMOL/L (ref 3.5–5.3)
PROT SERPL-MCNC: 7.1 G/DL (ref 6.4–8.2)
PROT UR STRIP-MCNC: NEGATIVE MG/DL
RBC # BLD AUTO: 5.85 MILLION/UL (ref 3.88–5.62)
SODIUM SERPL-SCNC: 137 MMOL/L (ref 136–145)
SP GR UR STRIP.AUTO: 1.02 (ref 1–1.03)
TRIGL SERPL-MCNC: 183 MG/DL
UROBILINOGEN UR QL STRIP.AUTO: 0.2 E.U./DL
WBC # BLD AUTO: 6.22 THOUSAND/UL (ref 4.31–10.16)

## 2020-11-09 PROCEDURE — 82043 UR ALBUMIN QUANTITATIVE: CPT | Performed by: SPECIALIST

## 2020-11-09 PROCEDURE — 80061 LIPID PANEL: CPT

## 2020-11-09 PROCEDURE — 83036 HEMOGLOBIN GLYCOSYLATED A1C: CPT

## 2020-11-09 PROCEDURE — 83735 ASSAY OF MAGNESIUM: CPT

## 2020-11-09 PROCEDURE — 85025 COMPLETE CBC W/AUTO DIFF WBC: CPT

## 2020-11-09 PROCEDURE — 3044F HG A1C LEVEL LT 7.0%: CPT | Performed by: INTERNAL MEDICINE

## 2020-11-09 PROCEDURE — 3061F NEG MICROALBUMINURIA REV: CPT | Performed by: INTERNAL MEDICINE

## 2020-11-09 PROCEDURE — 36415 COLL VENOUS BLD VENIPUNCTURE: CPT

## 2020-11-09 PROCEDURE — 80053 COMPREHEN METABOLIC PANEL: CPT

## 2020-11-09 PROCEDURE — 82570 ASSAY OF URINE CREATININE: CPT | Performed by: SPECIALIST

## 2020-11-09 PROCEDURE — 81003 URINALYSIS AUTO W/O SCOPE: CPT | Performed by: SPECIALIST

## 2020-11-17 ENCOUNTER — OFFICE VISIT (OUTPATIENT)
Dept: FAMILY MEDICINE CLINIC | Facility: CLINIC | Age: 45
End: 2020-11-17
Payer: COMMERCIAL

## 2020-11-17 VITALS
SYSTOLIC BLOOD PRESSURE: 140 MMHG | WEIGHT: 246 LBS | HEIGHT: 71 IN | HEART RATE: 88 BPM | TEMPERATURE: 98 F | BODY MASS INDEX: 34.44 KG/M2 | OXYGEN SATURATION: 96 % | DIASTOLIC BLOOD PRESSURE: 92 MMHG

## 2020-11-17 DIAGNOSIS — E11.9 TYPE 2 DIABETES MELLITUS TREATED WITHOUT INSULIN (HCC): Primary | ICD-10-CM

## 2020-11-17 DIAGNOSIS — D56.3 THALASSEMIA TRAIT: ICD-10-CM

## 2020-11-17 DIAGNOSIS — E78.00 PURE HYPERCHOLESTEROLEMIA: ICD-10-CM

## 2020-11-17 DIAGNOSIS — F31.31 BIPOLAR AFFECTIVE DISORDER, CURRENTLY DEPRESSED, MILD (HCC): ICD-10-CM

## 2020-11-17 PROCEDURE — 3008F BODY MASS INDEX DOCD: CPT | Performed by: INTERNAL MEDICINE

## 2020-11-17 PROCEDURE — 1036F TOBACCO NON-USER: CPT | Performed by: INTERNAL MEDICINE

## 2020-11-17 PROCEDURE — 99396 PREV VISIT EST AGE 40-64: CPT | Performed by: INTERNAL MEDICINE

## 2020-11-17 PROCEDURE — 99214 OFFICE O/P EST MOD 30 MIN: CPT | Performed by: INTERNAL MEDICINE

## 2020-12-17 DIAGNOSIS — E11.9 TYPE 2 DIABETES MELLITUS WITHOUT COMPLICATION, WITHOUT LONG-TERM CURRENT USE OF INSULIN (HCC): ICD-10-CM

## 2021-03-10 DIAGNOSIS — Z23 ENCOUNTER FOR IMMUNIZATION: ICD-10-CM

## 2021-05-09 DIAGNOSIS — E11.9 TYPE 2 DIABETES MELLITUS WITHOUT COMPLICATION, WITHOUT LONG-TERM CURRENT USE OF INSULIN (HCC): ICD-10-CM

## 2021-06-09 ENCOUNTER — APPOINTMENT (OUTPATIENT)
Dept: LAB | Facility: CLINIC | Age: 46
End: 2021-06-09
Payer: COMMERCIAL

## 2021-06-09 DIAGNOSIS — E78.00 PURE HYPERCHOLESTEROLEMIA: ICD-10-CM

## 2021-06-09 DIAGNOSIS — D56.3 THALASSEMIA TRAIT: ICD-10-CM

## 2021-06-09 DIAGNOSIS — E11.9 TYPE 2 DIABETES MELLITUS TREATED WITHOUT INSULIN (HCC): ICD-10-CM

## 2021-06-09 LAB
ALBUMIN SERPL BCP-MCNC: 3.7 G/DL (ref 3.5–5)
ALP SERPL-CCNC: 74 U/L (ref 46–116)
ALT SERPL W P-5'-P-CCNC: 32 U/L (ref 12–78)
ANION GAP SERPL CALCULATED.3IONS-SCNC: 5 MMOL/L (ref 4–13)
AST SERPL W P-5'-P-CCNC: 14 U/L (ref 5–45)
BASOPHILS # BLD AUTO: 0.06 THOUSANDS/ΜL (ref 0–0.1)
BASOPHILS NFR BLD AUTO: 1 % (ref 0–1)
BILIRUB SERPL-MCNC: 0.68 MG/DL (ref 0.2–1)
BUN SERPL-MCNC: 15 MG/DL (ref 5–25)
CALCIUM SERPL-MCNC: 9 MG/DL (ref 8.3–10.1)
CHLORIDE SERPL-SCNC: 104 MMOL/L (ref 100–108)
CHOLEST SERPL-MCNC: 179 MG/DL (ref 50–200)
CO2 SERPL-SCNC: 28 MMOL/L (ref 21–32)
CREAT SERPL-MCNC: 0.92 MG/DL (ref 0.6–1.3)
EOSINOPHIL # BLD AUTO: 0.4 THOUSAND/ΜL (ref 0–0.61)
EOSINOPHIL NFR BLD AUTO: 6 % (ref 0–6)
ERYTHROCYTE [DISTWIDTH] IN BLOOD BY AUTOMATED COUNT: 16.1 % (ref 11.6–15.1)
EST. AVERAGE GLUCOSE BLD GHB EST-MCNC: 137 MG/DL
GFR SERPL CREATININE-BSD FRML MDRD: 100 ML/MIN/1.73SQ M
GLUCOSE P FAST SERPL-MCNC: 120 MG/DL (ref 65–99)
HBA1C MFR BLD: 6.4 %
HCT VFR BLD AUTO: 47.6 % (ref 36.5–49.3)
HDLC SERPL-MCNC: 45 MG/DL
HGB BLD-MCNC: 14.5 G/DL (ref 12–17)
IMM GRANULOCYTES # BLD AUTO: 0.02 THOUSAND/UL (ref 0–0.2)
IMM GRANULOCYTES NFR BLD AUTO: 0 % (ref 0–2)
LDLC SERPL CALC-MCNC: 88 MG/DL (ref 0–100)
LDLC SERPL DIRECT ASSAY-MCNC: 109 MG/DL (ref 0–100)
LYMPHOCYTES # BLD AUTO: 2.92 THOUSANDS/ΜL (ref 0.6–4.47)
LYMPHOCYTES NFR BLD AUTO: 41 % (ref 14–44)
MCH RBC QN AUTO: 24.1 PG (ref 26.8–34.3)
MCHC RBC AUTO-ENTMCNC: 30.5 G/DL (ref 31.4–37.4)
MCV RBC AUTO: 79 FL (ref 82–98)
MONOCYTES # BLD AUTO: 0.55 THOUSAND/ΜL (ref 0.17–1.22)
MONOCYTES NFR BLD AUTO: 8 % (ref 4–12)
NEUTROPHILS # BLD AUTO: 3.13 THOUSANDS/ΜL (ref 1.85–7.62)
NEUTS SEG NFR BLD AUTO: 44 % (ref 43–75)
NONHDLC SERPL-MCNC: 134 MG/DL
NRBC BLD AUTO-RTO: 0 /100 WBCS
PLATELET # BLD AUTO: 274 THOUSANDS/UL (ref 149–390)
PMV BLD AUTO: 11.8 FL (ref 8.9–12.7)
POTASSIUM SERPL-SCNC: 3.9 MMOL/L (ref 3.5–5.3)
PROT SERPL-MCNC: 7.1 G/DL (ref 6.4–8.2)
RBC # BLD AUTO: 6.02 MILLION/UL (ref 3.88–5.62)
SODIUM SERPL-SCNC: 137 MMOL/L (ref 136–145)
TRIGL SERPL-MCNC: 230 MG/DL
WBC # BLD AUTO: 7.08 THOUSAND/UL (ref 4.31–10.16)

## 2021-06-09 PROCEDURE — 80061 LIPID PANEL: CPT

## 2021-06-09 PROCEDURE — 85025 COMPLETE CBC W/AUTO DIFF WBC: CPT

## 2021-06-09 PROCEDURE — 36415 COLL VENOUS BLD VENIPUNCTURE: CPT

## 2021-06-09 PROCEDURE — 83721 ASSAY OF BLOOD LIPOPROTEIN: CPT

## 2021-06-09 PROCEDURE — 80053 COMPREHEN METABOLIC PANEL: CPT

## 2021-06-09 PROCEDURE — 83036 HEMOGLOBIN GLYCOSYLATED A1C: CPT

## 2021-06-14 ENCOUNTER — TELEPHONE (OUTPATIENT)
Dept: FAMILY MEDICINE CLINIC | Facility: CLINIC | Age: 46
End: 2021-06-14

## 2021-06-14 NOTE — TELEPHONE ENCOUNTER
Patient called back and was notified about his blood work and will make an appt will Dr Kristin Ernandez for some time in August

## 2021-07-01 DIAGNOSIS — E11.9 TYPE 2 DIABETES MELLITUS WITHOUT COMPLICATION, WITHOUT LONG-TERM CURRENT USE OF INSULIN (HCC): ICD-10-CM

## 2021-07-12 DIAGNOSIS — E78.2 MIXED HYPERLIPIDEMIA: ICD-10-CM

## 2021-07-13 RX ORDER — ROSUVASTATIN CALCIUM 5 MG/1
TABLET, COATED ORAL
Qty: 90 TABLET | Refills: 3 | Status: SHIPPED | OUTPATIENT
Start: 2021-07-13 | End: 2022-02-10

## 2021-09-02 ENCOUNTER — OFFICE VISIT (OUTPATIENT)
Dept: FAMILY MEDICINE CLINIC | Facility: CLINIC | Age: 46
End: 2021-09-02
Payer: COMMERCIAL

## 2021-09-02 VITALS
HEIGHT: 71 IN | BODY MASS INDEX: 34.86 KG/M2 | DIASTOLIC BLOOD PRESSURE: 90 MMHG | RESPIRATION RATE: 18 BRPM | SYSTOLIC BLOOD PRESSURE: 120 MMHG | WEIGHT: 249 LBS | OXYGEN SATURATION: 96 % | HEART RATE: 75 BPM | TEMPERATURE: 99.9 F

## 2021-09-02 DIAGNOSIS — E11.9 TYPE 2 DIABETES MELLITUS TREATED WITHOUT INSULIN (HCC): Primary | ICD-10-CM

## 2021-09-02 DIAGNOSIS — E78.2 MIXED HYPERLIPIDEMIA: ICD-10-CM

## 2021-09-02 DIAGNOSIS — F31.31 BIPOLAR AFFECTIVE DISORDER, CURRENTLY DEPRESSED, MILD (HCC): ICD-10-CM

## 2021-09-02 DIAGNOSIS — D56.3 THALASSEMIA TRAIT: ICD-10-CM

## 2021-09-02 PROCEDURE — 3074F SYST BP LT 130 MM HG: CPT | Performed by: INTERNAL MEDICINE

## 2021-09-02 PROCEDURE — 99214 OFFICE O/P EST MOD 30 MIN: CPT | Performed by: INTERNAL MEDICINE

## 2021-09-02 PROCEDURE — 3080F DIAST BP >= 90 MM HG: CPT | Performed by: INTERNAL MEDICINE

## 2021-09-02 RX ORDER — OMEGA-3-ACID ETHYL ESTERS 1 G/1
1 CAPSULE, LIQUID FILLED ORAL 2 TIMES DAILY
Qty: 60 CAPSULE | Refills: 5 | Status: SHIPPED | OUTPATIENT
Start: 2021-09-02 | End: 2022-02-10

## 2021-09-02 NOTE — PROGRESS NOTES
Assessment/Plan:   this 64-year-old male  With obesity with a BMI of 34 7, history of type 2 diabetes mellitus improved on metformin 500 b i d  with hemoglobin A1c of 6 4 fasting blood sugar 120  Patient will remain on metformin and attempt to lose weight and diet more carefully  He is on Abilify 5 mg daily in addition to Lexapro 20 mg daily and Abilify his risk factor for blood sugar elevation  Patient has mixed hyperlipidemia on rosuvastatin 5 mg his direct LDL is 109 is triglyceride is 230 lavage of 1000 b i d  is added today and repeat labs in the future  It is likely that his rosuvastatin and possibly his low oz a will need to be increased  The patient has microcytosis and hemoglobin electrophoresis demonstrates thalassemia trait without significant anemia but with microcytosis  The patient was educated about this and warned not to have a confused with iron deficiency  Diet reviewed  Lifestyle modifications reviewed  Medications reviewed and ordered  Laboratory tests and studies reviewed and ordered  All patient's questions answered to patient satisfaction  Chief Complaint   Patient presents with    Type 2 diabetes mellitus treated without insulin    Bipolar affective disorder, currently depressed, mild ([facility]     Pure hypercholesterolemia    Depression         Diagnoses and all orders for this visit:    Type 2 diabetes mellitus treated without insulin ([facility] )  -     Ambulatory referral to Ophthalmology; Future        Subjective:     HPI     Patient ID: Ceci Alvarenga is a 40 y o  male  Current Outpatient Medications:     ARIPiprazole (ABILIFY) 5 mg tablet, Take 1 tablet by mouth daily , Disp: , Rfl:     Blood Glucose Monitoring Suppl (ONE TOUCH ULTRA 2) w/Device KIT, by Does not apply route daily Test twice daily  , Disp: 1 each, Rfl: 0    cetirizine (ZyrTEC) 5 MG tablet, , Disp: , Rfl:     Continuous Blood Gluc  (FreeStyle Geraldine 14 Day Wheeler) TINO, Use  Daily  To  Monitor  sugars, Disp: 1 Device, Rfl: 0    escitalopram (LEXAPRO) 20 mg tablet, Take 20 mg by mouth daily, Disp: , Rfl:     fluticasone (FLONASE) 50 mcg/act nasal spray, 2 sprays into each nostril as needed , Disp: , Rfl:     glucose blood test strip, Test twice daily Dx: Diabetes E11 9  one touch ultra 2 test strips, Disp: 200 each, Rfl: 1    metFORMIN (GLUCOPHAGE) 500 mg tablet, ONE WITH BREAKFAST, 2 WITH DINNER, Disp: 90 tablet, Rfl: 1    rosuvastatin (CRESTOR) 5 mg tablet, TAKE 1 TABLET BY MOUTH EVERY DAY, Disp: 90 tablet, Rfl: 3    zolpidem (AMBIEN) 10 mg tablet, Take 10 mg by mouth daily at bedtime , Disp: , Rfl:     Continuous Blood Gluc Sensor ([employer] [facility] LoveStyle Geraldine 14 Day Sensor) MISC, 1 PATCH BY DOES NOT APPLY ROUTE EVERY 14 (FOURTEEN) DAYS, Disp: 1 each, Rfl: 6    loratadine (CLARITIN) 10 mg tablet, Take 1 tablet by mouth daily  (Patient not taking: Reported on 9/2/2021), Disp: , Rfl:     typhoid live vaccine (VIVOTIF) DR capsule, Take one tab PO day 0, 2, 4 & day 6 (Patient not taking: Reported on 9/2/2021), Disp: 4 capsule, Rfl: 0    The following portions of the patient's history were reviewed and updated as appropriate: allergies, current medications, past family history, past medical history, past social history, past surgical history and problem list     Review of Systems   Constitutional: Negative for appetite change, fatigue, fever and unexpected weight change  HENT: Negative for rhinorrhea, sinus pressure, sinus pain, sneezing and sore throat  Eyes: Negative for visual disturbance  Respiratory: Negative for cough, chest tightness, shortness of breath and wheezing  Cardiovascular: Negative for chest pain, palpitations and leg swelling  Gastrointestinal: Negative for abdominal distention, abdominal pain, blood in stool, constipation, diarrhea, nausea and vomiting  Endocrine: Negative for polydipsia and polyuria     Genitourinary: Negative for decreased urine volume, difficulty urinating, dysuria, hematuria and urgency  Musculoskeletal: Negative for arthralgias, back pain, joint swelling and neck pain  Skin: Negative for rash  Allergic/Immunologic: Negative for environmental allergies  Neurological: Negative for tremors, weakness, light-headedness, numbness and headaches  Hematological: Does not bruise/bleed easily  Psychiatric/Behavioral: Negative for agitation, behavioral problems, confusion and dysphoric mood  The patient is not nervous/anxious  Family History   Problem Relation Age of Onset    No Known Problems Mother     No Known Problems Father        Past Medical History:   Diagnosis Date    Allergic     Anxiety     Bipolar affective disorder [facility])     sees Dr Jono Wiseman    Depression     Type 2 diabetes mellitus [facility] without [facility] ([facility] ) 11/17/2020       History reviewed  No pertinent surgical history  Social History     Socioeconomic History    Marital status: Single     Spouse name: None    Number of children: None    Years of education: None    Highest education level: None   Occupational History    Occupation: unemployed   Tobacco Use    Smoking status: Never Smoker    Smokeless tobacco: Never Used   Substance and Sexual Activity    Alcohol use: Not Currently    Drug use: Never     Comment: drug use in past- lcd, marijuana, katamine, cocaine    Sexual activity: Yes   Other Topics Concern    None   Social History Narrative    From [place] fluent in both languages     Social Determinants of Health     Financial Resource Strain:     Difficulty of Paying Living Expenses:    Food Insecurity:     Worried About Running Out of Food in the Last Year:     [address] in the Last Year:    Transportation Needs:     Lack of Transportation (Medical):      Lack of Transportation (Non-Medical):    Physical Activity:     Days of Exercise per Week:     Minutes of Exercise per Session:    Stress:     Feeling of Stress :    Social Connections:     Frequency of Communication with Friends and Family:     Frequency of Social Gatherings with Friends and Family:     Attends [employer] Services:     Active Member of Clubs or Organizations:     Attends Club or Organization Meetings:     Marital Status:    Intimate Partner Violence:     Fear of Current or Ex-Partner:     Emotionally Abused:     Physically Abused:     Sexually Abused: Allergies   Allergen Reactions    No Known Allergies        BMI Counseling: Body mass index is 34 73 kg/m²  The BMI is above normal  Nutrition recommendations include decreasing portion sizes, encouraging healthy choices of fruits and vegetables, decreasing fast food intake, consuming healthier snacks, limiting drinks that contain sugar, moderation in carbohydrate intake, increasing intake of lean protein, reducing intake of saturated and trans fat and reducing intake of cholesterol  Exercise recommendations include moderate physical activity 150 minutes/week  No pharmacotherapy was ordered  Patient referred to PCP due to patient being overweight  Objective:    /90 (BP Location: Left arm, Patient Position: Sitting, Cuff Size: Adult)   Pulse 75   Temp 99 9 °F (37 7 °C)   Resp 18   Ht 5' 11" (1 803 m)   Wt 113 kg (249 lb)   SpO2 96%   BMI 34 73 kg/m²        Physical Exam  Constitutional:       General: He is not in acute distress  Appearance: He is well-developed  HENT:      Head: Normocephalic and atraumatic  Nose: Nose normal       Mouth/Throat:      Pharynx: No oropharyngeal exudate  Eyes:      General: No scleral icterus  Conjunctiva/sclera: Conjunctivae normal       Pupils: Pupils are equal, round, and reactive to light  Neck:      Thyroid: No thyromegaly  Vascular: No JVD  Trachea: No tracheal deviation  Cardiovascular:      Rate and Rhythm: Normal rate and regular rhythm  Heart sounds: Normal heart sounds  No murmur heard  No friction rub  No gallop      Pulmonary: Effort: Pulmonary effort is normal  No respiratory distress  Breath sounds: No wheezing or rales  Chest:      Chest wall: No tenderness  Abdominal:      General: Bowel sounds are normal  There is no distension  Palpations: Abdomen is soft  There is no mass  Tenderness: There is no abdominal tenderness  There is no guarding or rebound  Musculoskeletal:         General: No deformity  Normal range of motion  Cervical back: Normal range of motion and neck supple  Lymphadenopathy:      Cervical: No cervical adenopathy  Skin:     General: Skin is warm and dry  Findings: No rash  Neurological:      Mental Status: He is alert and oriented to person, place, and time  Cranial Nerves: No cranial nerve deficit  Coordination: Coordination normal    Psychiatric:         Behavior: Behavior normal          Thought Content:  Thought content normal          Judgment: Judgment normal

## 2021-11-29 DIAGNOSIS — E78.2 MIXED HYPERLIPIDEMIA: Primary | ICD-10-CM

## 2021-11-29 DIAGNOSIS — E66.3 OVERWEIGHT: ICD-10-CM

## 2021-11-29 DIAGNOSIS — E11.9 TYPE 2 DIABETES MELLITUS TREATED WITHOUT INSULIN (HCC): ICD-10-CM

## 2021-11-29 PROBLEM — E66.811 OBESITY (BMI 30.0-34.9): Status: ACTIVE | Noted: 2021-11-29

## 2021-11-29 PROBLEM — E66.9 OBESITY (BMI 30.0-34.9): Status: ACTIVE | Noted: 2021-11-29

## 2022-01-04 ENCOUNTER — NURSE TRIAGE (OUTPATIENT)
Dept: OTHER | Facility: OTHER | Age: 47
End: 2022-01-04

## 2022-01-05 DIAGNOSIS — R51.9 NONINTRACTABLE HEADACHE, UNSPECIFIED CHRONICITY PATTERN, UNSPECIFIED HEADACHE TYPE: ICD-10-CM

## 2022-01-05 DIAGNOSIS — R52 BODY ACHES: Primary | ICD-10-CM

## 2022-01-05 PROCEDURE — U0005 INFEC AGEN DETEC AMPLI PROBE: HCPCS | Performed by: INTERNAL MEDICINE

## 2022-01-05 PROCEDURE — U0003 INFECTIOUS AGENT DETECTION BY NUCLEIC ACID (DNA OR RNA); SEVERE ACUTE RESPIRATORY SYNDROME CORONAVIRUS 2 (SARS-COV-2) (CORONAVIRUS DISEASE [COVID-19]), AMPLIFIED PROBE TECHNIQUE, MAKING USE OF HIGH THROUGHPUT TECHNOLOGIES AS DESCRIBED BY CMS-2020-01-R: HCPCS | Performed by: INTERNAL MEDICINE

## 2022-01-05 NOTE — TELEPHONE ENCOUNTER
Regarding: Symptomatic COVID congestion   ----- Message from Stefani García sent at 1/4/2022  7:50 PM EST -----  "i'm not feeling good, i want to know if i could get a COVID test  I have mild body aches, headache, nasal congestion and cough"

## 2022-01-06 ENCOUNTER — TELEPHONE (OUTPATIENT)
Dept: FAMILY MEDICINE CLINIC | Facility: CLINIC | Age: 47
End: 2022-01-06

## 2022-02-02 ENCOUNTER — APPOINTMENT (OUTPATIENT)
Dept: LAB | Facility: CLINIC | Age: 47
End: 2022-02-02
Payer: COMMERCIAL

## 2022-02-02 DIAGNOSIS — E78.2 MIXED HYPERLIPIDEMIA: ICD-10-CM

## 2022-02-02 DIAGNOSIS — E11.9 TYPE 2 DIABETES MELLITUS TREATED WITHOUT INSULIN (HCC): ICD-10-CM

## 2022-02-02 DIAGNOSIS — E66.3 OVERWEIGHT: ICD-10-CM

## 2022-02-02 LAB
ALBUMIN SERPL BCP-MCNC: 3.7 G/DL (ref 3.5–5)
ALP SERPL-CCNC: 80 U/L (ref 46–116)
ALT SERPL W P-5'-P-CCNC: 33 U/L (ref 12–78)
ANION GAP SERPL CALCULATED.3IONS-SCNC: 6 MMOL/L (ref 4–13)
AST SERPL W P-5'-P-CCNC: 15 U/L (ref 5–45)
BASOPHILS # BLD AUTO: 0.04 THOUSANDS/ΜL (ref 0–0.1)
BASOPHILS NFR BLD AUTO: 1 % (ref 0–1)
BILIRUB SERPL-MCNC: 1.47 MG/DL (ref 0.2–1)
BILIRUB UR QL STRIP: NEGATIVE
BUN SERPL-MCNC: 13 MG/DL (ref 5–25)
CALCIUM SERPL-MCNC: 9.2 MG/DL (ref 8.3–10.1)
CHLORIDE SERPL-SCNC: 103 MMOL/L (ref 100–108)
CLARITY UR: CLEAR
CO2 SERPL-SCNC: 28 MMOL/L (ref 21–32)
COLOR UR: YELLOW
CREAT SERPL-MCNC: 0.94 MG/DL (ref 0.6–1.3)
CREAT UR-MCNC: 155 MG/DL
EOSINOPHIL # BLD AUTO: 0.33 THOUSAND/ΜL (ref 0–0.61)
EOSINOPHIL NFR BLD AUTO: 5 % (ref 0–6)
ERYTHROCYTE [DISTWIDTH] IN BLOOD BY AUTOMATED COUNT: 16.3 % (ref 11.6–15.1)
EST. AVERAGE GLUCOSE BLD GHB EST-MCNC: 183 MG/DL
GFR SERPL CREATININE-BSD FRML MDRD: 97 ML/MIN/1.73SQ M
GLUCOSE P FAST SERPL-MCNC: 143 MG/DL (ref 65–99)
GLUCOSE UR STRIP-MCNC: NEGATIVE MG/DL
HBA1C MFR BLD: 8 %
HCT VFR BLD AUTO: 46.2 % (ref 36.5–49.3)
HGB BLD-MCNC: 14.2 G/DL (ref 12–17)
HGB UR QL STRIP.AUTO: NEGATIVE
IMM GRANULOCYTES # BLD AUTO: 0.01 THOUSAND/UL (ref 0–0.2)
IMM GRANULOCYTES NFR BLD AUTO: 0 % (ref 0–2)
KETONES UR STRIP-MCNC: NEGATIVE MG/DL
LDLC SERPL DIRECT ASSAY-MCNC: 105 MG/DL (ref 0–100)
LEUKOCYTE ESTERASE UR QL STRIP: NEGATIVE
LYMPHOCYTES # BLD AUTO: 2.45 THOUSANDS/ΜL (ref 0.6–4.47)
LYMPHOCYTES NFR BLD AUTO: 40 % (ref 14–44)
MCH RBC QN AUTO: 24.6 PG (ref 26.8–34.3)
MCHC RBC AUTO-ENTMCNC: 30.7 G/DL (ref 31.4–37.4)
MCV RBC AUTO: 80 FL (ref 82–98)
MICROALBUMIN UR-MCNC: 7.1 MG/L (ref 0–20)
MICROALBUMIN/CREAT 24H UR: 5 MG/G CREATININE (ref 0–30)
MONOCYTES # BLD AUTO: 0.38 THOUSAND/ΜL (ref 0.17–1.22)
MONOCYTES NFR BLD AUTO: 6 % (ref 4–12)
NEUTROPHILS # BLD AUTO: 2.87 THOUSANDS/ΜL (ref 1.85–7.62)
NEUTS SEG NFR BLD AUTO: 48 % (ref 43–75)
NITRITE UR QL STRIP: NEGATIVE
NRBC BLD AUTO-RTO: 0 /100 WBCS
PH UR STRIP.AUTO: 6 [PH]
PLATELET # BLD AUTO: 250 THOUSANDS/UL (ref 149–390)
PMV BLD AUTO: 12.3 FL (ref 8.9–12.7)
POTASSIUM SERPL-SCNC: 3.9 MMOL/L (ref 3.5–5.3)
PROT SERPL-MCNC: 7.1 G/DL (ref 6.4–8.2)
PROT UR STRIP-MCNC: NEGATIVE MG/DL
RBC # BLD AUTO: 5.77 MILLION/UL (ref 3.88–5.62)
SODIUM SERPL-SCNC: 137 MMOL/L (ref 136–145)
SP GR UR STRIP.AUTO: 1.02 (ref 1–1.03)
TRIGL SERPL-MCNC: 309 MG/DL
UROBILINOGEN UR QL STRIP.AUTO: 0.2 E.U./DL
WBC # BLD AUTO: 6.08 THOUSAND/UL (ref 4.31–10.16)

## 2022-02-02 PROCEDURE — 84478 ASSAY OF TRIGLYCERIDES: CPT

## 2022-02-02 PROCEDURE — 83721 ASSAY OF BLOOD LIPOPROTEIN: CPT

## 2022-02-02 PROCEDURE — 80053 COMPREHEN METABOLIC PANEL: CPT

## 2022-02-02 PROCEDURE — 3052F HG A1C>EQUAL 8.0%<EQUAL 9.0%: CPT | Performed by: INTERNAL MEDICINE

## 2022-02-02 PROCEDURE — 83036 HEMOGLOBIN GLYCOSYLATED A1C: CPT

## 2022-02-02 PROCEDURE — 85025 COMPLETE CBC W/AUTO DIFF WBC: CPT

## 2022-02-02 PROCEDURE — 82570 ASSAY OF URINE CREATININE: CPT

## 2022-02-02 PROCEDURE — 82043 UR ALBUMIN QUANTITATIVE: CPT

## 2022-02-02 PROCEDURE — 36415 COLL VENOUS BLD VENIPUNCTURE: CPT

## 2022-02-02 PROCEDURE — 81003 URINALYSIS AUTO W/O SCOPE: CPT

## 2022-02-10 ENCOUNTER — TELEPHONE (OUTPATIENT)
Dept: ADMINISTRATIVE | Facility: OTHER | Age: 47
End: 2022-02-10

## 2022-02-10 ENCOUNTER — OFFICE VISIT (OUTPATIENT)
Dept: FAMILY MEDICINE CLINIC | Facility: CLINIC | Age: 47
End: 2022-02-10
Payer: COMMERCIAL

## 2022-02-10 VITALS
HEART RATE: 76 BPM | HEIGHT: 71 IN | OXYGEN SATURATION: 98 % | TEMPERATURE: 99.4 F | WEIGHT: 250.2 LBS | RESPIRATION RATE: 16 BRPM | DIASTOLIC BLOOD PRESSURE: 76 MMHG | BODY MASS INDEX: 35.03 KG/M2 | SYSTOLIC BLOOD PRESSURE: 128 MMHG

## 2022-02-10 DIAGNOSIS — E11.9 TYPE 2 DIABETES MELLITUS WITHOUT COMPLICATION, WITHOUT LONG-TERM CURRENT USE OF INSULIN (HCC): ICD-10-CM

## 2022-02-10 DIAGNOSIS — E66.9 OBESITY (BMI 30.0-34.9): ICD-10-CM

## 2022-02-10 DIAGNOSIS — E11.9 TYPE 2 DIABETES MELLITUS TREATED WITHOUT INSULIN (HCC): ICD-10-CM

## 2022-02-10 DIAGNOSIS — F32.89 OTHER DEPRESSION: Primary | ICD-10-CM

## 2022-02-10 DIAGNOSIS — E78.2 MIXED HYPERLIPIDEMIA: ICD-10-CM

## 2022-02-10 DIAGNOSIS — U09.9 POST-ACUTE SEQUELAE OF COVID-19 (PASC): ICD-10-CM

## 2022-02-10 PROCEDURE — 3078F DIAST BP <80 MM HG: CPT | Performed by: INTERNAL MEDICINE

## 2022-02-10 PROCEDURE — 3074F SYST BP LT 130 MM HG: CPT | Performed by: INTERNAL MEDICINE

## 2022-02-10 PROCEDURE — 99214 OFFICE O/P EST MOD 30 MIN: CPT | Performed by: INTERNAL MEDICINE

## 2022-02-10 RX ORDER — OMEGA-3-ACID ETHYL ESTERS 1 G/1
1 CAPSULE, LIQUID FILLED ORAL 2 TIMES DAILY
Qty: 60 CAPSULE | Refills: 5 | Status: SHIPPED | OUTPATIENT
Start: 2022-02-10

## 2022-02-10 RX ORDER — MELATONIN
1000 EVERY OTHER DAY
COMMUNITY

## 2022-02-10 RX ORDER — ROSUVASTATIN CALCIUM 10 MG/1
5 TABLET, COATED ORAL DAILY
Qty: 90 TABLET | Refills: 3 | Status: SHIPPED | OUTPATIENT
Start: 2022-02-10 | End: 2022-02-13

## 2022-02-10 RX ORDER — BIOTIN 1000 MCG
TABLET,CHEWABLE ORAL DAILY
COMMUNITY

## 2022-02-10 NOTE — TELEPHONE ENCOUNTER
----- Message from Gato Goldsmith sent at 2/10/2022 11:30 AM EST -----  Regarding: Request Quality  02/10/22 11:30 AM    Hello, our patient Konsatntin Vallejo has had Diabetic Eye Exam completed/performed  Please assist in updating the patient chart by making an External outreach to Dr Cheryl CAMPOS  facility located in [address] [place] [address]  The date of service is 2021    Thank you,  Jameson Chaves MA  [facility] PRIMARY CARE [facility]

## 2022-02-10 NOTE — PROGRESS NOTES
Assessment/Plan: This 56-year-old male had mild COVID-19 probably the 0 micron variant in the 2nd half of January  He appears to have made a full recovery  His hemoglobin A1c is 8 0 today and probably reflects arise and sugar during his illness and indiscretions during the [place] holidays  His usual hemoglobin A1c is 6 6  His metformin will be increased to a 1000 mg twice daily  He does not check his sugars faithfully but was asked to do so regularly at least fasting  His triglycerides were in the 300s and his direct LDL was 105  His lavaza will be increased to 1000 twice daily, and his rosuvastatin will be increased to 10 mg daily  His diabetic foot examination was within normal limits  His recent eye examination did not show any retinopathy  His bilirubin on the laboratory studies done a week after his COVID demonstrated a bilirubin of 3 in all his previous bilirubins have been normal   Other liver function tests were within normal I explained that 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 and that the elevation is explained looked by this  He will repeat have repeat liver function tests along with a direct bilirubin and gamma-GT prior to the next visit  Diet reviewed  Lifestyle modifications reviewed  Medications reviewed and ordered  Laboratory tests and studies reviewed and ordered  All patient's questions answered to patient satisfaction  Chief Complaint   Patient presents with    Physical Exam     labs done 2/2/22    Diabetes         Diagnoses and all orders for this visit:    Other depression    Type 2 diabetes mellitus treated without insulin ([facility] )  -     Comprehensive metabolic panel; Future  -     CBC and differential; Future  -     Comprehensive metabolic panel; Future  -     Gamma GT; Future  -     LDL cholesterol, direct; Future  -     Triglycerides; Future  -     Hemoglobin A1C; Future  -     omega-3-acid ethyl esters (LOVAZA) 1 g capsule;  Take 1 capsule (1 g total) by mouth 2 (two) times a day    Mixed hyperlipidemia  -     Comprehensive metabolic panel; Future  -     CBC and differential; Future  -     Comprehensive metabolic panel; Future  -     Gamma GT; Future  -     LDL cholesterol, direct; Future  -     Triglycerides; Future  -     Hemoglobin A1C; Future  -     rosuvastatin (CRESTOR) 10 MG tablet; Take 0 5 tablets (5 mg total) by mouth daily    Obesity (BMI 30 0-34  9)    Type 2 diabetes mellitus treated without insulin ([facility])  Comments:   hemoglobin A1c 6 4 on 500 metformin twice daily  To continue same and recheck labs  Orders:  -     Comprehensive metabolic panel; Future  -     CBC and differential; Future  -     Comprehensive metabolic panel; Future  -     Gamma GT; Future  -     LDL cholesterol, direct; Future  -     Triglycerides; Future  -     Hemoglobin A1C; Future  -     omega-3-acid ethyl esters (LOVAZA) 1 g capsule; Take 1 capsule (1 g total) by mouth 2 (two) times a day    Type 2 diabetes mellitus without complication, without long-term current use of insulin ([facility])  -     metFORMIN (GLUCOPHAGE) 500 mg tablet; Take 2 tablets (1,000 mg total) by mouth 2 (two) times a day with meals    Post-acute sequelae of COVID-19 ([facility])    Other orders  -     cholecalciferol (VITAMIN D3) 1,000 units tablet; Take 1,000 Units by mouth every other day  -     Biotin 1000 MCG CHEW; Chew in the morning  -     Multiple Vitamins-Minerals (CENTRUM SILVER PO); Take by mouth        Subjective: This 81-year-old male had mild COVID-19 probably the 0 micron variant in the 2nd half of January  He appears to have made a full recovery  His hemoglobin A1c is 8 0 today and probably reflects arise and sugar during his illness and indiscretions during the [place] holidays  His usual hemoglobin A1c is 6 6  His metformin will be increased to a 1000 mg twice daily  He does not check his sugars faithfully but was asked to do so regularly at least fasting    His triglycerides were in the 300s and his direct LDL was 105  His lavaza will be increased to 1000 twice daily, and his rosuvastatin will be increased to 10 mg daily  His diabetic foot examination was within normal limits  His recent eye examination did not show any retinopathy  His bilirubin on the laboratory studies done a week after his COVID demonstrated a bilirubin of 3 in all his previous bilirubins have been normal   Other liver function tests were within normal I explained that 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 and that the elevation is explained looked by this  He will repeat have repeat liver function tests along with a direct bilirubin and gamma-GT prior to the next visit  Diabetes  Pertinent negatives for hypoglycemia include no confusion, headaches, nervousness/anxiousness or tremors  Pertinent negatives for diabetes include no chest pain, no fatigue, no polydipsia, no polyuria and no weakness  Patient ID: Carlton Coronel is a 40 y o  male  Current Outpatient Medications:     ARIPiprazole (ABILIFY) 5 mg tablet, Take 1 tablet by mouth daily , Disp: , Rfl:     Biotin 1000 MCG CHEW, Chew in the morning, Disp: , Rfl:     Blood Glucose Monitoring Suppl (ONE TOUCH ULTRA 2) w/Device KIT, by Does not apply route daily Test twice daily  , Disp: 1 each, Rfl: 0    cetirizine (ZyrTEC) 5 MG tablet, Take 5 mg by mouth daily  , Disp: , Rfl:     cholecalciferol (VITAMIN D3) 1,000 units tablet, Take 1,000 Units by mouth every other day, Disp: , Rfl:     escitalopram (LEXAPRO) 20 mg tablet, Take 20 mg by mouth daily, Disp: , Rfl:     fluticasone (FLONASE) 50 mcg/act nasal spray, 2 sprays into each nostril as needed , Disp: , Rfl:     glucose blood test strip, Test twice daily Dx: Diabetes E11 9  one touch ultra 2 test strips, Disp: 200 each, Rfl: 1    metFORMIN (GLUCOPHAGE) 500 mg tablet, Take 2 tablets (1,000 mg total) by mouth 2 (two) times a day with meals, Disp: 120 tablet, Rfl: 11    Multiple Vitamins-Minerals (CENTRUM SILVER PO), Take by mouth, Disp: , Rfl:     omega-3-acid ethyl esters (LOVAZA) 1 g capsule, Take 1 capsule (1 g total) by mouth 2 (two) times a day, Disp: 60 capsule, Rfl: 5    rosuvastatin (CRESTOR) 10 MG tablet, Take 0 5 tablets (5 mg total) by mouth daily, Disp: 90 tablet, Rfl: 3    zolpidem (AMBIEN) 10 mg tablet, Take 10 mg by mouth daily at bedtime , Disp: , Rfl:     loratadine (CLARITIN) 10 mg tablet, Take 1 tablet by mouth daily  (Patient not taking: Reported on 9/2/2021), Disp: , Rfl:     typhoid live vaccine (VIVOTIF) DR capsule, Take one tab PO day 0, 2, 4 & day 6 (Patient not taking: Reported on 9/2/2021), Disp: 4 capsule, Rfl: 0    The following portions of the patient's history were reviewed and updated as appropriate: allergies, current medications, past family history, past medical history, past social history, past surgical history and problem list     Review of Systems   Constitutional: Negative for appetite change, fatigue, fever and unexpected weight change  HENT: Negative for rhinorrhea, sinus pressure, sinus pain, sneezing and sore throat  Eyes: Negative for visual disturbance  Respiratory: Negative for cough, chest tightness, shortness of breath and wheezing  Cardiovascular: Negative for chest pain, palpitations and leg swelling  Gastrointestinal: Negative for abdominal distention, abdominal pain, blood in stool, constipation, diarrhea, nausea and vomiting  Endocrine: Negative for polydipsia and polyuria  Genitourinary: Negative for decreased urine volume, difficulty urinating, dysuria, hematuria and urgency  Musculoskeletal: Negative for arthralgias, back pain, joint swelling and neck pain  Skin: Negative for rash  Allergic/Immunologic: Negative for environmental allergies  Neurological: Negative for tremors, weakness, light-headedness, numbness and headaches  Hematological: Does not bruise/bleed easily     Psychiatric/Behavioral: Negative for agitation, behavioral problems, confusion and dysphoric mood  The patient is not nervous/anxious  Family History   Problem Relation Age of Onset    No Known Problems Mother     No Known Problems Father        Past Medical History:   Diagnosis Date    Allergic     Anxiety     Bipolar affective disorder ([place] [address] )     sees Dr Alexandra Payton    Depression     Diabetes mellitus ([place] [address] )     Obesity     Type 2 diabetes mellitus treated without insulin ([place] [address] ) 11/17/2020       History reviewed  No pertinent surgical history  Social History     Socioeconomic History    Marital status: Single     Spouse name: None    Number of children: None    Years of education: None    Highest education level: None   Occupational History    Occupation: unemployed   Tobacco Use    Smoking status: Former Smoker    Smokeless tobacco: Never Used   Vaping Use    Vaping Use: Never used   Substance and Sexual Activity    Alcohol use: Not Currently    Drug use: Never     Comment: drug use in past- lcd, marijuana, katamine, cocaine    Sexual activity: Yes   Other Topics Concern    None   Social History Narrative    From [place] fluent in both languages     Social Determinants of Health     Financial Resource Strain: Not on file   Food Insecurity: Not on file   Transportation Needs: Not on file   Physical Activity: Not on file   Stress: Not on file   Social Connections: Not on file   Intimate Partner Violence: Not on file   Housing Stability: Not on file       Allergies   Allergen Reactions    No Known Allergies        BMI Counseling: Body mass index is 34 9 kg/m²  The BMI is above normal  Nutrition recommendations include decreasing portion sizes, consuming healthier snacks, limiting drinks that contain sugar, moderation in carbohydrate intake, increasing intake of lean protein, reducing intake of saturated and trans fat and reducing intake of cholesterol  Exercise recommendations include exercising 3-5 times per week  No pharmacotherapy was ordered   Patient referred to PCP  Rationale for BMI follow-up plan is due to patient being overweight or obese  Objective:    /76 (BP Location: Left arm, Patient Position: Sitting, Cuff Size: Large)   Pulse 76   Temp 99 4 °F (37 4 °C) (Tympanic)   Resp 16   Ht 5' 11" (1 803 m)   Wt 113 kg (250 lb 3 2 oz)   SpO2 98%   BMI 34 90 kg/m²        Physical Exam  Constitutional:       General: He is not in acute distress  Appearance: He is well-developed  HENT:      Head: Normocephalic and atraumatic  Nose: Nose normal       Mouth/Throat:      Pharynx: No oropharyngeal exudate  Eyes:      General: No scleral icterus  Conjunctiva/sclera: Conjunctivae normal       Pupils: Pupils are equal, round, and reactive to light  Neck:      Thyroid: No thyromegaly  Vascular: No JVD  Trachea: No tracheal deviation  Cardiovascular:      Rate and Rhythm: Normal rate and regular rhythm  Heart sounds: Normal heart sounds  No murmur heard  No friction rub  No gallop  Pulmonary:      Effort: Pulmonary effort is normal  No respiratory distress  Breath sounds: No wheezing or rales  Chest:      Chest wall: No tenderness  Abdominal:      General: Bowel sounds are normal  There is no distension  Palpations: Abdomen is soft  There is no mass  Tenderness: There is no abdominal tenderness  There is no guarding or rebound  Musculoskeletal:         General: No deformity  Normal range of motion  Cervical back: Normal range of motion and neck supple  Lymphadenopathy:      Cervical: No cervical adenopathy  Skin:     General: Skin is warm and dry  Findings: No rash  Neurological:      Mental Status: He is alert and oriented to person, place, and time  Cranial Nerves: No cranial nerve deficit  Coordination: Coordination normal    Psychiatric:         Behavior: Behavior normal          Thought Content:  Thought content normal          Judgment: Judgment normal

## 2022-02-10 NOTE — PROGRESS NOTES
Diabetic Foot Exam    Patient's shoes and socks removed  Right Foot/Ankle   Right Foot Inspection  Skin Exam: skin normal and skin intact  No dry skin, no warmth, no callus, no erythema, no maceration, no abnormal color, no pre-ulcer, no ulcer and no callus  Toe Exam: ROM and strength within normal limits  Sensory   Monofilament testing: intact    Vascular  Capillary refills: < 3 seconds  The right DP pulse is 2+  The right PT pulse is 2+  Left Foot/Ankle  Left Foot Inspection  Skin Exam: skin normal and skin intact  No dry skin, no warmth, no erythema, no maceration, normal color, no pre-ulcer, no ulcer and no callus  Toe Exam: ROM and strength within normal limits  Sensory   Monofilament testing: intact    Vascular  Capillary refills: < 3 seconds  The left DP pulse is 2+  The left PT pulse is 2+       Assign Risk Category  No deformity present  No loss of protective sensation  No weak pulses  Risk: 0

## 2022-02-11 ENCOUNTER — TELEPHONE (OUTPATIENT)
Dept: FAMILY MEDICINE CLINIC | Facility: CLINIC | Age: 47
End: 2022-02-11

## 2022-02-11 NOTE — TELEPHONE ENCOUNTER
Patient states that he is suppose to take 1 mg of rosuvastatin but the script that was sent to the pharmacy was only 0 5mg  Please review

## 2022-02-15 NOTE — TELEPHONE ENCOUNTER
Upon review of the In Basket request we have found this is a duplicate request and no further action is needed  This request was completed upon initial request, the patient chart is up to date, and this message will now be closed  Any additional questions or concerns should be emailed to the Practice Liaisons via [email or web address] email, please do not reply via In Basket      Thank you  Brenda Atkins

## 2022-04-08 DIAGNOSIS — Z11.52 ENCOUNTER FOR SCREENING FOR COVID-19: Primary | ICD-10-CM

## 2022-07-18 DIAGNOSIS — U07.1 COVID-19: Primary | ICD-10-CM

## 2022-07-18 DIAGNOSIS — U07.1 COVID: ICD-10-CM

## 2022-07-18 PROCEDURE — U0003 INFECTIOUS AGENT DETECTION BY NUCLEIC ACID (DNA OR RNA); SEVERE ACUTE RESPIRATORY SYNDROME CORONAVIRUS 2 (SARS-COV-2) (CORONAVIRUS DISEASE [COVID-19]), AMPLIFIED PROBE TECHNIQUE, MAKING USE OF HIGH THROUGHPUT TECHNOLOGIES AS DESCRIBED BY CMS-2020-01-R: HCPCS | Performed by: INTERNAL MEDICINE

## 2022-07-18 PROCEDURE — U0005 INFEC AGEN DETEC AMPLI PROBE: HCPCS | Performed by: INTERNAL MEDICINE

## 2022-07-19 LAB — SARS-COV-2 RNA RESP QL NAA+PROBE: NEGATIVE

## 2022-08-01 ENCOUNTER — APPOINTMENT (OUTPATIENT)
Dept: LAB | Facility: CLINIC | Age: 47
End: 2022-08-01
Payer: COMMERCIAL

## 2022-08-01 DIAGNOSIS — E78.2 MIXED HYPERLIPIDEMIA: ICD-10-CM

## 2022-08-01 DIAGNOSIS — E11.9 TYPE 2 DIABETES MELLITUS TREATED WITHOUT INSULIN (HCC): ICD-10-CM

## 2022-08-01 LAB
ALBUMIN SERPL BCP-MCNC: 3.7 G/DL (ref 3.5–5)
ALP SERPL-CCNC: 80 U/L (ref 46–116)
ALT SERPL W P-5'-P-CCNC: 29 U/L (ref 12–78)
ANION GAP SERPL CALCULATED.3IONS-SCNC: 2 MMOL/L (ref 4–13)
AST SERPL W P-5'-P-CCNC: 14 U/L (ref 5–45)
BASOPHILS # BLD AUTO: 0.06 THOUSANDS/ΜL (ref 0–0.1)
BASOPHILS NFR BLD AUTO: 1 % (ref 0–1)
BILIRUB SERPL-MCNC: 0.59 MG/DL (ref 0.2–1)
BUN SERPL-MCNC: 17 MG/DL (ref 5–25)
CALCIUM SERPL-MCNC: 9 MG/DL (ref 8.3–10.1)
CHLORIDE SERPL-SCNC: 105 MMOL/L (ref 96–108)
CO2 SERPL-SCNC: 29 MMOL/L (ref 21–32)
CREAT SERPL-MCNC: 1.02 MG/DL (ref 0.6–1.3)
EOSINOPHIL # BLD AUTO: 0.39 THOUSAND/ΜL (ref 0–0.61)
EOSINOPHIL NFR BLD AUTO: 5 % (ref 0–6)
ERYTHROCYTE [DISTWIDTH] IN BLOOD BY AUTOMATED COUNT: 15.2 % (ref 11.6–15.1)
GFR SERPL CREATININE-BSD FRML MDRD: 87 ML/MIN/1.73SQ M
GGT SERPL-CCNC: 79 U/L (ref 5–85)
GLUCOSE P FAST SERPL-MCNC: 139 MG/DL (ref 65–99)
HCT VFR BLD AUTO: 46.4 % (ref 36.5–49.3)
HGB BLD-MCNC: 14.2 G/DL (ref 12–17)
IMM GRANULOCYTES # BLD AUTO: 0.02 THOUSAND/UL (ref 0–0.2)
IMM GRANULOCYTES NFR BLD AUTO: 0 % (ref 0–2)
LDLC SERPL DIRECT ASSAY-MCNC: 87 MG/DL (ref 0–100)
LYMPHOCYTES # BLD AUTO: 2.85 THOUSANDS/ΜL (ref 0.6–4.47)
LYMPHOCYTES NFR BLD AUTO: 36 % (ref 14–44)
MCH RBC QN AUTO: 23.9 PG (ref 26.8–34.3)
MCHC RBC AUTO-ENTMCNC: 30.6 G/DL (ref 31.4–37.4)
MCV RBC AUTO: 78 FL (ref 82–98)
MONOCYTES # BLD AUTO: 0.5 THOUSAND/ΜL (ref 0.17–1.22)
MONOCYTES NFR BLD AUTO: 6 % (ref 4–12)
NEUTROPHILS # BLD AUTO: 4 THOUSANDS/ΜL (ref 1.85–7.62)
NEUTS SEG NFR BLD AUTO: 52 % (ref 43–75)
NRBC BLD AUTO-RTO: 0 /100 WBCS
PLATELET # BLD AUTO: 297 THOUSANDS/UL (ref 149–390)
PMV BLD AUTO: 11.5 FL (ref 8.9–12.7)
POTASSIUM SERPL-SCNC: 4 MMOL/L (ref 3.5–5.3)
PROT SERPL-MCNC: 7 G/DL (ref 6.4–8.4)
RBC # BLD AUTO: 5.95 MILLION/UL (ref 3.88–5.62)
SODIUM SERPL-SCNC: 136 MMOL/L (ref 135–147)
TRIGL SERPL-MCNC: 243 MG/DL
WBC # BLD AUTO: 7.82 THOUSAND/UL (ref 4.31–10.16)

## 2022-08-01 PROCEDURE — 83721 ASSAY OF BLOOD LIPOPROTEIN: CPT

## 2022-08-01 PROCEDURE — 84478 ASSAY OF TRIGLYCERIDES: CPT

## 2022-08-01 PROCEDURE — 36415 COLL VENOUS BLD VENIPUNCTURE: CPT

## 2022-08-01 PROCEDURE — 82977 ASSAY OF GGT: CPT

## 2022-08-01 PROCEDURE — 85025 COMPLETE CBC W/AUTO DIFF WBC: CPT

## 2022-08-01 PROCEDURE — 83036 HEMOGLOBIN GLYCOSYLATED A1C: CPT

## 2022-08-01 PROCEDURE — 80053 COMPREHEN METABOLIC PANEL: CPT

## 2022-08-02 LAB
EST. AVERAGE GLUCOSE BLD GHB EST-MCNC: 171 MG/DL
HBA1C MFR BLD: 7.6 %

## 2022-08-04 ENCOUNTER — OFFICE VISIT (OUTPATIENT)
Dept: FAMILY MEDICINE CLINIC | Facility: CLINIC | Age: 47
End: 2022-08-04
Payer: COMMERCIAL

## 2022-08-04 VITALS
OXYGEN SATURATION: 96 % | RESPIRATION RATE: 18 BRPM | WEIGHT: 246.6 LBS | HEIGHT: 71 IN | BODY MASS INDEX: 34.52 KG/M2 | HEART RATE: 71 BPM | TEMPERATURE: 98.6 F | DIASTOLIC BLOOD PRESSURE: 90 MMHG | SYSTOLIC BLOOD PRESSURE: 130 MMHG

## 2022-08-04 DIAGNOSIS — D56.3 THALASSEMIA TRAIT: ICD-10-CM

## 2022-08-04 DIAGNOSIS — F32.89 OTHER DEPRESSION: ICD-10-CM

## 2022-08-04 DIAGNOSIS — E73.9 LACTOSE INTOLERANCE: ICD-10-CM

## 2022-08-04 DIAGNOSIS — Z00.00 WELLNESS EXAMINATION: Primary | ICD-10-CM

## 2022-08-04 DIAGNOSIS — Z23 IMMUNIZATION DUE: ICD-10-CM

## 2022-08-04 DIAGNOSIS — Z71.89 ENCOUNTER FOR CARDIAC RISK COUNSELING: ICD-10-CM

## 2022-08-04 DIAGNOSIS — F31.31 BIPOLAR AFFECTIVE DISORDER, CURRENTLY DEPRESSED, MILD (HCC): ICD-10-CM

## 2022-08-04 DIAGNOSIS — Z12.11 COLON CANCER SCREENING: ICD-10-CM

## 2022-08-04 DIAGNOSIS — E11.9 TYPE 2 DIABETES MELLITUS TREATED WITHOUT INSULIN (HCC): ICD-10-CM

## 2022-08-04 DIAGNOSIS — E88.81 METABOLIC SYNDROME: ICD-10-CM

## 2022-08-04 DIAGNOSIS — E78.2 MIXED HYPERLIPIDEMIA: ICD-10-CM

## 2022-08-04 DIAGNOSIS — E66.9 OBESITY (BMI 30.0-34.9): ICD-10-CM

## 2022-08-04 PROBLEM — E88.810 METABOLIC SYNDROME: Status: ACTIVE | Noted: 2022-08-04

## 2022-08-04 LAB — SL AMB POCT HEMOGLOBIN AIC: 7.5 (ref ?–6.5)

## 2022-08-04 PROCEDURE — 3075F SYST BP GE 130 - 139MM HG: CPT | Performed by: INTERNAL MEDICINE

## 2022-08-04 PROCEDURE — 3051F HG A1C>EQUAL 7.0%<8.0%: CPT | Performed by: INTERNAL MEDICINE

## 2022-08-04 PROCEDURE — 90471 IMMUNIZATION ADMIN: CPT

## 2022-08-04 PROCEDURE — 3080F DIAST BP >= 90 MM HG: CPT | Performed by: INTERNAL MEDICINE

## 2022-08-04 PROCEDURE — 99396 PREV VISIT EST AGE 40-64: CPT | Performed by: INTERNAL MEDICINE

## 2022-08-04 PROCEDURE — 90677 PCV20 VACCINE IM: CPT

## 2022-08-04 PROCEDURE — 83036 HEMOGLOBIN GLYCOSYLATED A1C: CPT | Performed by: INTERNAL MEDICINE

## 2022-08-04 NOTE — PROGRESS NOTES
ADULT ANNUAL PHYSICAL  [facility]    NAME: Yesi Morales  AGE: 56 y o  SEX: male  : 1975     DATE: 2022     Assessment and Plan: This 41-year-old male type 2 diabetic with mixed hyperlipidemia, family history of heart disease bipolar affective disorder obesity and metabolic syndrome presents for an adult annual physical/wellness exam     He will be sent for colon cancer screen colonoscopy but also to be done because he has intermittent diarrhea most likely from his lactose intolerance and metformin therapy  He will also have a Tdap booster and a Prevnar 20, as well as a flu vaccine and COVID-19 booster in the fall  Coronary calcium count was ordered to further re-evaluate the aggressiveness of his medical therapy due to his could cardiac risk factors  In addition EKG will be done next visit  He has no symptomatic heart disease  Patient's hemoglobin A1c was 7 6 on metformin 1000 mg b i d  Because of his obesity he will be started on rip else's 3 mg daily for the next month and then return to evaluate for increased to 7 mg daily  His it was suggested that he decrease his metformin to 500 mg daily and if he has further diarrhea upon starting rib else this that he consider stopping the metformin  Other milk alternatives were discussed in addition to the lactate that he uses  Problem List Items Addressed This Visit        Endocrine    Type 2 diabetes mellitus treated without insulin (HCC)    Relevant Medications    Semaglutide (Rybelsus) 3 MG TABS    Other Relevant Orders    CT coronary calcium score    POCT hemoglobin A1c (Completed)       Other    Depression    Relevant Orders    CT coronary calcium score    Thalassemia trait    Bipolar affective disorder, currently depressed, mild ([address] )    Mixed hyperlipidemia    Relevant Orders    CT coronary calcium score    Obesity (BMI 30 0-34  9)    Encounter for cardiac risk counseling Metabolic syndrome    Lactose intolerance      Other Visit Diagnoses     Wellness examination    -  Primary    Colon cancer screening        Relevant Orders    Ambulatory referral for colonoscopy    Immunization due        Relevant Orders    Pneumococcal Conjugate Vaccine 20-valent (Pcv20) (Completed)          Health maintenance and preventative care screenings were discussed with patient today  Appropriate education was printed on patient's after visit summary  · Discussed risks/benefits of screening for   · Physical exam  · Cholesterol test  · Blood pressure screening  · Eye exam  · Dental exam  · Prostate Cancer  · Colon Cancer  Tabby Gastelum agrees to look in to having screenings done, if not already completed  · Immunizations were reviewed:   Immunization History   Administered Date(s) Administered    COVID-19 MODERNA VACC 0 5 ML IM 02/12/2021, 03/12/2021, 10/25/2021    DTaP 1975, 1975, 03/15/1977, 05/19/1979, 09/01/1981    DTaP,unspecified 1975, 1976, 03/15/1977, 05/19/1979, 09/01/1981    Hep A, adult 08/29/2019    Hep B, Adolescent or Pediatric 08/14/1993, 09/11/1993, 02/05/1994    INFLUENZA 09/26/2013, 10/12/2017, 10/10/2018, 10/06/2020, 10/11/2021    IPV 1975, 1975, 03/15/1977, 05/19/1979    MMR 05/19/1979, 08/10/1990, 06/04/2019    Pneumococcal Conjugate Vaccine 20-valent (Pcv20), Polysace 08/04/2022    Td (adult), Unspecified 09/05/1979    Td (adult), adsorbed 09/05/1979    Yellow Fever 02/22/2013         Counseling:  · Exercise: the importance of regular exercise/physical activity was discussed  Recommend exercise 3-5 times per week for at least 30 minutes  No follow-ups on file  Chief Complaint:     Chief Complaint   Patient presents with    Annual Exam      History of Present Illness:     Adult Annual Physical   Patient here for a comprehensive physical exam  The patient reports no problems       Diet and Physical Activity  · Diet/Nutrition: well balanced diet  · Weight concerns: None / Has concerns what where addressed today  · Exercise: 5-7 times a week on average  Depression Screening  PHQ-2/9 Depression Screening           General Health  · Sleep: gets 7-8 hours of sleep on average  · Hearing: normal - bilateral   · Vision: vision problems: None  · Dental: regular dental visits  [facility]  · History of STDs?: no   · Erectile dysfunction: no      Review of Systems:     Review of Systems   Constitutional: Negative for appetite change, fatigue, fever and unexpected weight change  HENT: Negative for rhinorrhea, sinus pressure, sinus pain, sneezing and sore throat  Eyes: Negative for visual disturbance  Respiratory: Negative for cough, chest tightness, shortness of breath and wheezing  Cardiovascular: Negative for chest pain, palpitations and leg swelling  Gastrointestinal: Positive for diarrhea  Negative for abdominal distention, abdominal pain, blood in stool, constipation, nausea and vomiting  Endocrine: Negative for polydipsia and polyuria  Genitourinary: Negative for decreased urine volume, difficulty urinating, dysuria, hematuria and urgency  Musculoskeletal: Negative for arthralgias, back pain, joint swelling and neck pain  Skin: Negative for rash  Allergic/Immunologic: Negative for environmental allergies  Neurological: Negative for tremors, weakness, light-headedness, numbness and headaches  Hematological: Does not bruise/bleed easily  Psychiatric/Behavioral: Negative for agitation, behavioral problems, confusion and dysphoric mood  The patient is not nervous/anxious         Past Medical History:     Past Medical History:   Diagnosis Date    Allergic     Anxiety     Bipolar affective disorder [facility])     sees Dr Talib Sanchez Depression     Diabetes mellitus ([facility] )     Obesity     Type 2 diabetes mellitus treated without insulin ([facility] ) 11/17/2020      Past Surgical History: No past surgical history on file  Social History:     Social History     Socioeconomic History    Marital status: Single     Spouse name: None    Number of children: None    Years of education: None    Highest education level: None   Occupational History    Occupation: unemployed   Tobacco Use    Smoking status: Former Smoker    Smokeless tobacco: Never Used   Vaping Use    Vaping Use: Never used   Substance and Sexual Activity    Alcohol use: Not Currently    Drug use: Never     Comment: drug use in past- lcd, marijuana, katamine, cocaine    Sexual activity: Yes   Other Topics Concern    None   Social History Narrative    From [place] fluent in both languages     Social Determinants of Health     Financial Resource Strain: Not on file   Food Insecurity: Not on file   Transportation Needs: Not on file   Physical Activity: Not on file   Stress: Not on file   Social Connections: Not on file   Intimate Partner Violence: Not on file   Housing Stability: Not on file      Family History:     Family History   Problem Relation Age of Onset    No Known Problems Mother     No Known Problems Father       Current Medications:     Current Outpatient Medications   Medication Sig Dispense Refill    ARIPiprazole (ABILIFY) 5 mg tablet Take 1 tablet by mouth daily       Biotin 1000 MCG CHEW Chew in the morning      Blood Glucose Monitoring Suppl (ONE TOUCH ULTRA 2) w/Device KIT by Does not apply route daily Test twice daily   1 each 0    cetirizine (ZyrTEC) 5 MG tablet Take 5 mg by mouth daily        cholecalciferol (VITAMIN D3) 1,000 units tablet Take 1,000 Units by mouth every other day      escitalopram (LEXAPRO) 20 mg tablet Take 20 mg by mouth daily      fluticasone (FLONASE) 50 mcg/act nasal spray 2 sprays into each nostril as needed       glucose blood test strip Test twice daily Dx: Diabetes E11 9  one touch ultra 2 test strips 200 each 1    metFORMIN (GLUCOPHAGE) 500 mg tablet Take 2 tablets (1,000 mg total) by mouth 2 (two) times a day with meals 120 tablet 11    Multiple Vitamins-Minerals (CENTRUM SILVER PO) Take by mouth      omega-3-acid ethyl esters (LOVAZA) 1 g capsule Take 1 capsule (1 g total) by mouth 2 (two) times a day 60 capsule 5    rosuvastatin (CRESTOR) 10 MG tablet Take 1 tablet (10 mg total) by mouth daily 90 tablet 3    Semaglutide (Rybelsus) 3 MG TABS Take 3 mg by mouth in the morning 30 tablet 0    zolpidem (AMBIEN) 10 mg tablet Take 10 mg by mouth daily at bedtime       loratadine (CLARITIN) 10 mg tablet Take 1 tablet by mouth daily  (Patient not taking: Reported on 9/2/2021)      typhoid live vaccine (VIVOTIF) DR capsule Take one tab PO day 0, 2, 4 & day 6 (Patient not taking: No sig reported) 4 capsule 0     No current facility-administered medications for this visit  Allergies: Allergies   Allergen Reactions    No Known Allergies       Objective:     /90 (BP Location: Left arm, Patient Position: Sitting, Cuff Size: Adult)   Pulse 71   Temp 98 6 °F (37 °C)   Resp 18   Ht 5' 11" (1 803 m)   Wt 112 kg (246 lb 9 6 oz)   SpO2 96%   BMI 34 39 kg/m²     Physical Exam  Vitals and nursing note reviewed  Constitutional:       Appearance: He is well-developed  He is obese  HENT:      Head: Normocephalic and atraumatic  Nose: Nose normal    Eyes:      Conjunctiva/sclera: Conjunctivae normal       Pupils: Pupils are equal, round, and reactive to light  Neck:      Thyroid: Thyromegaly present  Cardiovascular:      Rate and Rhythm: Normal rate and regular rhythm  Heart sounds: Normal heart sounds  Pulmonary:      Effort: Pulmonary effort is normal       Breath sounds: Normal breath sounds  Abdominal:      General: Bowel sounds are normal       Palpations: Abdomen is soft  There is no mass  Tenderness: There is no abdominal tenderness  There is no rebound  Hernia: No hernia is present     Musculoskeletal:         General: No tenderness  Normal range of motion  Cervical back: Normal range of motion and neck supple  Lymphadenopathy:      Cervical: No cervical adenopathy  Skin:     General: Skin is warm and dry  Findings: No rash  Neurological:      Mental Status: He is alert and oriented to person, place, and time  Cranial Nerves: No cranial nerve deficit  Coordination: Coordination normal    Psychiatric:         Behavior: Behavior normal          Thought Content:  Thought content normal          Judgment: Judgment normal           Health Maintenance:     Health Maintenance   Topic Date Due    Hepatitis C Screening  Never done    DTaP,Tdap,and Td Vaccines (6 - Tdap) 03/07/1987    HIV Screening  Never done    Colorectal Cancer Screening  Never done    Influenza Vaccine (1) 09/01/2022    URINE MICROALBUMIN  02/02/2023    HEMOGLOBIN A1C  02/04/2023    BMI: Followup Plan  02/10/2023    Diabetic Foot Exam  02/10/2023    BMI: Adult  08/04/2023    Annual Physical  08/04/2023    DM Eye Exam  09/07/2023    Pneumococcal Vaccine: Pediatrics (0 to 5 Years) and At-Risk Patients (6 to 59 Years)  Completed    Hepatitis B Vaccine  Completed    IPV Vaccine  Completed    COVID-19 Vaccine  Completed    HIB Vaccine  Aged Out    Hepatitis A Vaccine  Aged Out    Meningococcal ACWY Vaccine  Aged Out    HPV Vaccine  Aged Out     Immunization History   Administered Date(s) Administered    COVID-19 MODERNA VACC 0 5 ML IM 02/12/2021, 03/12/2021, 10/25/2021    DTaP 1975, 1976, 03/15/1977, 05/19/1979, 09/01/1981    DTaP,unspecified 1976, 1976, 03/15/1977, 05/19/1979, 09/01/1981    Hep A, adult 08/29/2019    Hep B, Adolescent or Pediatric 08/14/1993, 09/11/1993, 02/05/1994    INFLUENZA 09/26/2013, 10/12/2017, 10/10/2018, 10/06/2020, 10/11/2021    IPV 1975, 1975, 03/15/1977, 05/19/1979    MMR 05/19/1979, 08/10/1990, 06/04/2019    Pneumococcal Conjugate Vaccine 20-valent (Pcv20), Polysace 08/04/2022    Td (adult), Unspecified 09/05/1979    Td (adult), adsorbed 09/05/1979    Yellow Fever 02/22/2013       Jorge Wheeler MD  [address]

## 2022-08-08 ENCOUNTER — TELEPHONE (OUTPATIENT)
Dept: GASTROENTEROLOGY | Facility: CLINIC | Age: 47
End: 2022-08-08

## 2022-08-11 ENCOUNTER — TELEPHONE (OUTPATIENT)
Dept: GASTROENTEROLOGY | Facility: CLINIC | Age: 47
End: 2022-08-11

## 2022-08-11 DIAGNOSIS — Z12.11 SPECIAL SCREENING FOR MALIGNANT NEOPLASMS, COLON: Primary | ICD-10-CM

## 2022-08-11 NOTE — TELEPHONE ENCOUNTER
Scheduled date of colonoscopy (as of today):12 22 22  Physician performing colonoscopy:DR OLIVO  Location of colonoscopy:[place]  Bowel prep reviewed with patient:KERRY  Instructions reviewed with patient by:AGATHA VERBALLY/MAILED  Clearances: N/A    08/08/22  Screened by: Mani Gregory    Referring Provider Dr Leandro Chao- Screening: There is no height or weight on file to calculate BMI  Has patient been referred for a routine screening Colonoscopy? yes  Is the patient between 39-70 years old? yes      Previous Colonoscopy no   If yes:    Date:     Facility:     Reason:       SCHEDULING STAFF: If the patient is between 45yrs-49yrs, please advise patient to confirm benefits/coverage with their insurance company for a routine screening colonoscopy, some insurance carriers will only cover at [facility] or older  If the patient is over 66years old, please schedule an office visit  Does the patient want to see a Gastroenterologist prior to their procedure OR are they having any GI symptoms? no    Has the patient been hospitalized or had abdominal surgery in the past 6 months? no    Does the patient use supplemental oxygen? no    Does the patient take Coumadin, Lovenox, Plavix, Elliquis, Xarelto, or other blood thinning medication? no    Has the patient had a stroke, cardiac event, or stent placed in the past year? no    SCHEDULING STAFF: If patient answers NO to above questions, then schedule procedure  If patient answers YES to above questions, then schedule office appointment  Pt passed OA and can be reached at [phone]    If patient is between 45yrs - 49yrs, please advise patient that we will have to confirm benefits & coverage with their insurance company for a routine screening colonoscopy

## 2022-08-22 NOTE — Clinical Note
DM assessment completed- for your records, no action needed   Thanks! -Santi Galdamez RD CDE Nsaids Counseling: NSAID Counseling: I discussed with the patient that NSAIDs should be taken with food. Prolonged use of NSAIDs can result in the development of stomach ulcers.  Patient advised to stop taking NSAIDs if abdominal pain occurs.  The patient verbalized understanding of the proper use and possible adverse effects of NSAIDs.  All of the patient's questions and concerns were addressed.

## 2022-09-07 ENCOUNTER — HOSPITAL ENCOUNTER (EMERGENCY)
Facility: HOSPITAL | Age: 47
Discharge: HOME | End: 2022-09-07
Attending: EMERGENCY MEDICINE
Payer: COMMERCIAL

## 2022-09-07 VITALS
DIASTOLIC BLOOD PRESSURE: 86 MMHG | SYSTOLIC BLOOD PRESSURE: 138 MMHG | OXYGEN SATURATION: 97 % | RESPIRATION RATE: 18 BRPM | TEMPERATURE: 98 F | WEIGHT: 246.91 LBS | BODY MASS INDEX: 34.44 KG/M2 | HEART RATE: 85 BPM

## 2022-09-07 DIAGNOSIS — L03.211 CELLULITIS OF FACE: Primary | ICD-10-CM

## 2022-09-07 PROCEDURE — 99284 EMERGENCY DEPT VISIT MOD MDM: CPT

## 2022-09-07 PROCEDURE — 99283 EMERGENCY DEPT VISIT LOW MDM: CPT

## 2022-09-07 PROCEDURE — 87529 HSV DNA AMP PROBE: CPT

## 2022-09-07 RX ORDER — CEPHALEXIN 250 MG/1
500 CAPSULE ORAL ONCE
Status: COMPLETED | OUTPATIENT
Start: 2022-09-07 | End: 2022-09-07

## 2022-09-07 RX ORDER — CEPHALEXIN 500 MG/1
500 CAPSULE ORAL EVERY 6 HOURS SCHEDULED
Qty: 20 CAPSULE | Refills: 0 | Status: SHIPPED | OUTPATIENT
Start: 2022-09-07 | End: 2022-09-12

## 2022-09-07 RX ADMIN — CEPHALEXIN 500 MG: 250 CAPSULE ORAL at 22:12

## 2022-09-08 ENCOUNTER — TELEPHONE (OUTPATIENT)
Dept: FAMILY MEDICINE CLINIC | Facility: CLINIC | Age: 47
End: 2022-09-08

## 2022-09-08 NOTE — TELEPHONE ENCOUNTER
Patient would like to know if there is any medication he can substitute for the Rybelsus since it was denied by his insurance

## 2022-09-08 NOTE — DISCHARGE INSTRUCTIONS
Please return to ED for any concerns outlined in the AVS or for any other concerns  Continue the full course of antibiotics as prescribed  Follow-up with your primary care provider in 2 days for re-evaluation    Continue to keep the area clean and dry

## 2022-09-08 NOTE — ED PROVIDER NOTES
History  Chief Complaint   Patient presents with    Skin Problem     States noticed red bump to chin and then began with swollen cervical lymph node  Advised by family to come in for eval       Patient is a 60-year-old male with a past medical history significant for T2DM, HLD and bipolar disorder presenting to the ED with a complaint of a red bump on his chin  Patient reports he had a small pimple in the area 5 days ago and when he shaved over that area thinks he cut the top of the pimple off    States since then the area has been increasing in size and redness  He denies any discharge or significant pain  No purulent head or vesicles  Denies having this before  Denies any other lesions  States this evening he noticed a small bump below the skin under his chin which concerning to him, prompting his visit to the ED  Denies fever, chills, nausea, CP, SOB, tongue swelling, pain/swelling below the tongue, throat swelling, dysphagia, inability to maintain oral secretions, intraoral lesions, other oropharyngeal complaints and any other complaint  State he has otherwise been feeling well  No recent abx use and no hx of MRSA infection  Prior to Admission Medications   Prescriptions Last Dose Informant Patient Reported? Taking? ARIPiprazole (ABILIFY) 5 mg tablet  Self Yes No   Sig: Take 1 tablet by mouth daily    Biotin 1000 MCG CHEW  Self Yes No   Sig: Chew in the morning   Blood Glucose Monitoring Suppl (ONE TOUCH ULTRA 2) w/Device KIT   No No   Sig: by Does not apply route daily Test twice daily     Multiple Vitamins-Minerals (CENTRUM SILVER PO)  Self Yes No   Sig: Take by mouth   Semaglutide (Rybelsus) 3 MG TABS   No No   Sig: Take 3 mg by mouth in the morning   cetirizine (ZyrTEC) 5 MG tablet  Self Yes No   Sig: Take 5 mg by mouth daily     cholecalciferol (VITAMIN D3) 1,000 units tablet  Self Yes No   Sig: Take 1,000 Units by mouth every other day   escitalopram (LEXAPRO) 20 mg tablet  Self Yes No Sig: Take 20 mg by mouth daily   fluticasone (FLONASE) 50 mcg/act nasal spray  Self Yes No   Si sprays into each nostril as needed    glucose blood test strip   No No   Sig: Test twice daily Dx: Diabetes E11 9  one touch ultra 2 test strips   loratadine (CLARITIN) 10 mg tablet  Self Yes No   Sig: Take 1 tablet by mouth daily    Patient not taking: Reported on 2021   metFORMIN (GLUCOPHAGE) 500 mg tablet   No No   Sig: Take 2 tablets (1,000 mg total) by mouth 2 (two) times a day with meals   omega-3-acid ethyl esters (LOVAZA) 1 g capsule   No No   Sig: Take 1 capsule (1 g total) by mouth 2 (two) times a day   polyethylene glycol (GOLYTELY) 4000 mL solution   No No   Sig: Take 4,000 mL by mouth once for 1 dose Take as directed by office   rosuvastatin (CRESTOR) 10 MG tablet   No No   Sig: Take 1 tablet (10 mg total) by mouth daily   typhoid live vaccine (VIVOTIF) DR capsule   No No   Sig: Take one tab PO day 0, 2, 4 & day 6   Patient not taking: No sig reported   zolpidem (AMBIEN) 10 mg tablet  Self Yes No   Sig: Take 10 mg by mouth daily at bedtime       Facility-Administered Medications: None       Past Medical History:   Diagnosis Date    Allergic     Anxiety     Bipolar affective disorder (HCC)     sees Dr Mary Snell    Depression     [facility]     Obesity     Type 2 diabetes mellitus treated without insulin ([facility] ) 2020       History reviewed  No pertinent surgical history  Family History   Problem Relation Age of Onset    No Known Problems Mother     No Known Problems Father      I have reviewed and agree with the history as documented      E-Cigarette/Vaping    E-Cigarette Use Never User      E-Cigarette/Vaping Substances    Nicotine No     THC No     CBD No     Flavoring No     Other No     Unknown No      Social History     Tobacco Use    Smoking status: Former Smoker    Smokeless tobacco: Never Used   Vaping Use    Vaping Use: Never used   Substance Use Topics    Alcohol use: Not Currently    Drug use: Never     Comment: drug use in past- lcd, marijuana, katamine, cocaine       Review of Systems   Constitutional: Negative for chills and fever  HENT: Negative for ear pain, facial swelling, mouth sores, sore throat, trouble swallowing and voice change  Eyes: Negative for pain and visual disturbance  Respiratory: Negative for cough and shortness of breath  Cardiovascular: Negative for chest pain and palpitations  Gastrointestinal: Negative for abdominal pain, diarrhea, nausea and vomiting  Genitourinary: Negative for dysuria and hematuria  Musculoskeletal: Negative for arthralgias and back pain  Skin: Negative for color change and rash  (+) Red bump over L side of chin   Neurological: Negative for seizures, syncope, weakness and headaches  Psychiatric/Behavioral: Negative for confusion  All other systems reviewed and are negative  Physical Exam  Physical Exam  Vitals and nursing note reviewed  Constitutional:       General: He is awake  He is not in acute distress  Appearance: He is well-developed  He is not ill-appearing  HENT:      Head: Normocephalic and atraumatic  Comments: 1 cm erythematous raised area with distinct border where indicated  Lesion is indurated  No purulent head, fluctuant mass, discharge, crusting, ringed lesions, target lesions, surrounding erythema, crepitus, vesicles or satellite lesions  Area is not significantly tender  No other lesions over the face  Mouth/Throat:      Mouth: Mucous membranes are moist       Pharynx: Oropharynx is clear  Comments: Oropharynx patent and clear  No oropharyngeal erythema, swelling or lesions  Pt maintaining oral secretions and swallowing without issue  Eyes:      Conjunctiva/sclera: Conjunctivae normal    Cardiovascular:      Rate and Rhythm: Normal rate and regular rhythm  Heart sounds: No murmur heard    Pulmonary:      Effort: Pulmonary effort is normal  No respiratory distress  Breath sounds: Normal breath sounds  Chest:      Chest wall: No swelling, tenderness, crepitus or edema  Abdominal:      Palpations: Abdomen is soft  Tenderness: There is no abdominal tenderness  Musculoskeletal:      Cervical back: Neck supple  Lymphadenopathy:      Head:      Right side of head: No submental, submandibular, tonsillar, preauricular, posterior auricular or occipital adenopathy  Left side of head: Submental adenopathy present  No submandibular, tonsillar, preauricular, posterior auricular or occipital adenopathy  Cervical: No cervical adenopathy  Comments: Singular small, soft and mobile submental lymph node present  No other lymphadenopathy appreciated  No overlying skin changes  Skin:     General: Skin is warm and dry  Capillary Refill: Capillary refill takes less than 2 seconds  Neurological:      General: No focal deficit present  Mental Status: He is alert  Vital Signs  ED Triage Vitals [09/07/22 2109]   Temperature Pulse Respirations Blood Pressure SpO2   98 °F (36 7 °C) 85 18 138/86 97 %      Temp Source Heart Rate Source Patient Position - Orthostatic VS BP Location FiO2 (%)   Oral Monitor Sitting Right arm --      Pain Score       No Pain           Vitals:    09/07/22 2109   BP: 138/86   Pulse: 85   Patient Position - Orthostatic VS: Sitting         Visual Acuity      ED Medications  Medications   cephalexin (KEFLEX) capsule 500 mg (500 mg Oral Given 9/7/22 2212)       Diagnostic Studies  Results Reviewed     Procedure Component Value Units Date/Time    HSV TYPE 1,2 DNA PCR [[id number]] Collected: 09/07/22 2212    Lab Status:  In process Specimen: Swab from Other Updated: 09/07/22 2218                 No orders to display              Procedures  Procedures         ED Course                               SBIRT 22yo+    Flowsheet Row Most Recent Value   SBIRT (25 yo +)    In order to provide better care to our patients, we are screening all of our patients for alcohol and drug use  Would it be okay to ask you these screening questions? No Filed at: 09/07/2022 2129                    MDM  Number of Diagnoses or Management Options     Amount and/or Complexity of Data Reviewed  Clinical lab tests: ordered    Risk of Complications, Morbidity, and/or Mortality  General comments: DDX including but not limited to: cellulitis, folliculitis, carbuncle  Doubt HSV, abscess, impetigo, dermatitis, necrotizing fasciitis  Patient presents to the ED with a complaint of an erythematous lesion on his chin for the last 5 days  No significant pain, discharge or systemic complaints  On exam patient has a raised and erythematous lesion over the left side of the chin  No active discharge or vesicles  Area is indurated with no significant surrounding erythema  Patient also has a left-sided submental inflamed lymph node, likely reactive  Will test area for HSV however appears to be cellulitic, will treat as such  One dose of antibiotics given in the ED and prescription sent to patient's pharmacy  Pending HSV PCR  Advised patient to follow-up with PCP in 2 days for re-evaluation and further management  Strict return precautions verbally communicated to the patient as outlined in the AVS   All patient questions and concerns were answered  Patient verbally communicated their understanding and agreement to the above plan  Patient stable at discharge      Patient Progress  Patient progress: stable      Disposition  Final diagnoses:   Cellulitis of face     Time reflects when diagnosis was documented in both MDM as applicable and the Disposition within this note     Time User Action Codes Description Comment    9/7/2022 10:22 PM Eloisa Barney Add [[id number]] Cellulitis of face       ED Disposition     ED Disposition   Discharge    Condition   Stable    Date/Time   Wed Sep 7, 2022 10:22 PM    Comment   Telly Martinez discharge to home/self care                Follow-up Information     Follow up With Specialties Details Why [address] [facility] Emergency Medicine Go to  As needed, If symptoms worsen [facility] [id number]  [address] [facility] [place] [facility], [address]  , [place], [address] [facility], [address]          Patient's Medications   Discharge Prescriptions    CEPHALEXIN (KEFLEX) 500 MG CAPSULE    Take 1 capsule (500 mg total) by mouth every 6 (six) hours for 5 days       Start Date: 9/7/2022  End Date: 9/12/2022       Order Dose: 500 mg       Quantity: 20 capsule    Refills: 0       No discharge procedures on file      PDMP Review     None          ED Provider  Electronically Signed by           Kimberly Andrew PA-C  09/07/22 1136

## 2022-09-12 ENCOUNTER — HOSPITAL ENCOUNTER (OUTPATIENT)
Dept: RADIOLOGY | Facility: HOSPITAL | Age: 47
Discharge: HOME | End: 2022-09-12
Payer: COMMERCIAL

## 2022-09-12 DIAGNOSIS — E78.2 MIXED HYPERLIPIDEMIA: ICD-10-CM

## 2022-09-12 DIAGNOSIS — F32.89 OTHER DEPRESSION: ICD-10-CM

## 2022-09-12 DIAGNOSIS — E11.9 TYPE 2 DIABETES MELLITUS TREATED WITHOUT INSULIN (HCC): ICD-10-CM

## 2022-09-12 PROCEDURE — 75571 CT HRT W/O DYE W/CA TEST: CPT

## 2022-09-12 PROCEDURE — G1004 CDSM NDSC: HCPCS

## 2022-09-13 ENCOUNTER — OFFICE VISIT (OUTPATIENT)
Dept: FAMILY MEDICINE CLINIC | Facility: CLINIC | Age: 47
End: 2022-09-13
Payer: COMMERCIAL

## 2022-09-13 VITALS
HEIGHT: 71 IN | DIASTOLIC BLOOD PRESSURE: 98 MMHG | WEIGHT: 247.2 LBS | BODY MASS INDEX: 34.61 KG/M2 | SYSTOLIC BLOOD PRESSURE: 132 MMHG | HEART RATE: 70 BPM | OXYGEN SATURATION: 96 % | TEMPERATURE: 97.4 F

## 2022-09-13 DIAGNOSIS — Z71.89 ENCOUNTER FOR CARDIAC RISK COUNSELING: ICD-10-CM

## 2022-09-13 DIAGNOSIS — E88.81 METABOLIC SYNDROME: ICD-10-CM

## 2022-09-13 DIAGNOSIS — F31.31 BIPOLAR AFFECTIVE DISORDER, CURRENTLY DEPRESSED, MILD (HCC): ICD-10-CM

## 2022-09-13 DIAGNOSIS — E11.9 TYPE 2 DIABETES MELLITUS TREATED WITHOUT INSULIN (HCC): Primary | ICD-10-CM

## 2022-09-13 DIAGNOSIS — E78.2 MIXED HYPERLIPIDEMIA: ICD-10-CM

## 2022-09-13 DIAGNOSIS — L03.211 FACIAL CELLULITIS: ICD-10-CM

## 2022-09-13 LAB
HSV1 DNA SPEC QL NAA+PROBE: NEGATIVE
HSV2 DNA SPEC QL NAA+PROBE: NEGATIVE

## 2022-09-13 PROCEDURE — 99214 OFFICE O/P EST MOD 30 MIN: CPT | Performed by: INTERNAL MEDICINE

## 2022-09-13 PROCEDURE — 3075F SYST BP GE 130 - 139MM HG: CPT | Performed by: INTERNAL MEDICINE

## 2022-09-13 PROCEDURE — 3080F DIAST BP >= 90 MM HG: CPT | Performed by: INTERNAL MEDICINE

## 2022-09-13 RX ORDER — SEMAGLUTIDE 1.34 MG/ML
0.5 INJECTION, SOLUTION SUBCUTANEOUS WEEKLY
Qty: 1.5 ML | Refills: 5 | Status: SHIPPED | OUTPATIENT
Start: 2022-09-13 | End: 2022-09-14

## 2022-09-15 NOTE — PROGRESS NOTES
Assessment/Plan: This 15-year-old male suffers from type 2 diabetes mellitus which is not well controlled with hemoglobin A1c of 8 and 7 6 which does not meet target  He has bipolar disorder and is on antipsychotic medication which is required to control his disorder but has cost him to gain weight and cost his diabetes to be out of control  He has a history of mixed hyperlipidemia but persists with a triglyceride that is close to 300 in a direct LDL of 87 which does not meet target  On rosuvastatin 10 mg and lobe oz 1 g b i d  His BMI is 34 5 which is borderline severely obese  He has metabolic syndrome  Rib else this preauthorization was recently turned down and was discontinued  It appears that this would be the best medication for for this patient who would have great difficulty giving himself injectable products due to phobias about injections  Of note the patient was recently treated for cellulitis with cephalexin which may be why his hemoglobin A1c has dropped a little lower due to the cephalexin some propensity to lower blood sugar  He was told to find out from his pharmacist which other G LP ones might be on the formulary of his insurance company  The patient is on metformin a 1000 b i d  he has been given dietary counseling  Facial cellulitis is resolving  Diet reviewed  Lifestyle modifications reviewed  Medications reviewed and ordered  Laboratory tests and studies reviewed and ordered  All patient's questions answered to patient satisfaction      Chief Complaint   Patient presents with    Follow-Up Cellulitis    Medication Problem     Patient needs to discuss medication change/ diabetes managment         Diagnoses and all orders for this visit:    Type 2 diabetes mellitus treated without insulin ([facility] )  -     Discontinue: Semaglutide,0 25 or 0 5MG/DOS, (Ozempic, 0 25 or 0 5 MG/DOSE,) 2 MG/1 5ML SOPN; Inject 0 5 mg under the skin once a week    Metabolic syndrome  -     Discontinue: Semaglutide,0 25 or 0 5MG/DOS, (Ozempic, 0 25 or 0 5 MG/DOSE,) 2 MG/1 5ML SOPN; Inject 0 5 mg under the skin once a week        Subjective: This 50-year-old male suffers from type 2 diabetes mellitus which is not well controlled with hemoglobin A1c of 8 and 7 6 which does not meet target  He has bipolar disorder and is on antipsychotic medication which is required to control his disorder but has cost him to gain weight and cost his diabetes to be out of control  He has a history of mixed hyperlipidemia but persists with a triglyceride that is close to 300 in a direct LDL of 87 which does not meet target  On rosuvastatin 10 mg and lobe oz 1 g b i d  His BMI is 34 5 which is borderline severely obese  He has metabolic syndrome  Rib else this preauthorization was recently turned down and was discontinued  It appears that this would be the best medication for for this patient who would have great difficulty giving himself injectable products due to phobias about injections  Of note the patient was recently treated for cellulitis with cephalexin which may be why his hemoglobin A1c has dropped a little lower due to the cephalexin some propensity to lower blood sugar  He was told to find out from his pharmacist which other G LP ones might be on the formulary of his insurance company  The patient is on metformin a 1000 b i d  he has been given dietary counseling  Diabetes  He has type 2 diabetes mellitus  No MedicAlert identification noted  The initial diagnosis of diabetes was made 6 years ago  Pertinent negatives for hypoglycemia include no confusion, dizziness, headaches, hunger, mood changes, nervousness/anxiousness, pallor, seizures, sleepiness, speech difficulty, sweats or tremors  Pertinent negatives for diabetes include no blurred vision, no chest pain, no fatigue, no foot paresthesias, no foot ulcerations, no polydipsia, no polyphagia, no polyuria, no visual change, no weakness and no weight loss  Pertinent negatives for hypoglycemia complications include no blackouts, no hospitalization, no nocturnal hypoglycemia, no required assistance and no required glucagon injection  Symptoms are stable  Pertinent negatives for diabetic complications include no CVA, heart disease, impotence, nephropathy, peripheral neuropathy, PVD or retinopathy  Risk factors for coronary artery disease include dyslipidemia and obesity  Current diabetic treatment includes oral agent (monotherapy)  He is compliant with treatment most of the time  His weight is decreasing steadily  He is following a generally healthy diet  Meal planning includes ADA exchanges, avoidance of concentrated sweets and carbohydrate counting  He has not had a previous visit with a dietitian  He participates in exercise daily  He monitors blood glucose at home 1-2 x per week  He monitors urine at home <1 x per month  Blood glucose monitoring compliance is adequate  There is no change in his home blood glucose trend  He does not see a podiatrist Eye exam is current  Patient ID: Emerson Egan is a 56 y o  male  Current Outpatient Medications:     ARIPiprazole (ABILIFY) 5 mg tablet, Take 1 tablet by mouth daily , Disp: , Rfl:     Biotin 1000 MCG CHEW, Chew in the morning, Disp: , Rfl:     Blood Glucose Monitoring Suppl (ONE TOUCH ULTRA 2) w/Device KIT, by Does not apply route daily Test twice daily  , Disp: 1 each, Rfl: 0    cetirizine (ZyrTEC) 5 MG tablet, Take 5 mg by mouth daily  , Disp: , Rfl:     cholecalciferol (VITAMIN D3) 1,000 units tablet, Take 1,000 Units by mouth every other day, Disp: , Rfl:     escitalopram (LEXAPRO) 20 mg tablet, Take 20 mg by mouth daily, Disp: , Rfl:     fluticasone (FLONASE) 50 mcg/act nasal spray, 2 sprays into each nostril as needed , Disp: , Rfl:     glucose blood test strip, Test twice daily Dx: Diabetes E11 9  one touch ultra 2 test strips, Disp: 200 each, Rfl: 1    metFORMIN (GLUCOPHAGE) 500 mg tablet, Take 2 tablets (1,000 mg total) by mouth 2 (two) times a day with meals, Disp: 120 tablet, Rfl: 11    Multiple Vitamins-Minerals (CENTRUM SILVER PO), Take by mouth, Disp: , Rfl:     omega-3-acid ethyl esters (LOVAZA) 1 g capsule, Take 1 capsule (1 g total) by mouth 2 (two) times a day, Disp: 60 capsule, Rfl: 5    rosuvastatin (CRESTOR) 10 MG tablet, Take 1 tablet (10 mg total) by mouth daily, Disp: 90 tablet, Rfl: 3    zolpidem (AMBIEN) 10 mg tablet, Take 10 mg by mouth daily at bedtime , Disp: , Rfl:     Dulaglutide (Trulicity) 3 04 GI/5 3FC SOPN, Inject 0 5 mL (0 75 mg total) under the skin every 7 days, Disp: 2 4 mL, Rfl: 5    loratadine (CLARITIN) 10 mg tablet, Take 1 tablet by mouth daily  (Patient not taking: No sig reported), Disp: , Rfl:     polyethylene glycol (GOLYTELY) 4000 mL solution, Take 4,000 mL by mouth once for 1 dose Take as directed by office, Disp: 4000 mL, Rfl: 0    typhoid live vaccine (VIVOTIF) DR capsule, Take one tab PO day 0, 2, 4 & day 6 (Patient not taking: No sig reported), Disp: 4 capsule, Rfl: 0    The following portions of the patient's history were reviewed and updated as appropriate: allergies, current medications, past family history, past medical history, past social history, past surgical history and problem list     Review of Systems   Constitutional: Negative for appetite change, fatigue, fever, unexpected weight change and weight loss  HENT: Negative for rhinorrhea, sinus pressure, sinus pain, sneezing and sore throat  Eyes: Negative for blurred vision and visual disturbance  Respiratory: Negative for cough, chest tightness, shortness of breath and wheezing  Cardiovascular: Negative for chest pain, palpitations and leg swelling  Gastrointestinal: Negative for abdominal distention, abdominal pain, blood in stool, constipation, diarrhea, nausea and vomiting  Endocrine: Negative for polydipsia, polyphagia and polyuria     Genitourinary: Negative for decreased urine volume, difficulty urinating, dysuria, hematuria, impotence and urgency  Musculoskeletal: Negative for arthralgias, back pain, joint swelling and neck pain  Skin: Negative for pallor and rash  Allergic/Immunologic: Negative for environmental allergies  Neurological: Negative for dizziness, tremors, seizures, speech difficulty, weakness, light-headedness, numbness and headaches  Hematological: Does not bruise/bleed easily  Psychiatric/Behavioral: Negative for agitation, behavioral problems, confusion and dysphoric mood  The patient is not nervous/anxious  Family History   Problem Relation Age of Onset    No Known Problems Mother     No Known Problems Father        Past Medical History:   Diagnosis Date    Allergic     Anxiety     Bipolar affective disorder ([place] [id number] )     sees Dr Britni Holder    Depression     Diabetes mellitus ([place] [id number] )     Obesity     Type 2 diabetes mellitus treated without insulin ([place] [id number] ) 11/17/2020       History reviewed  No pertinent surgical history      Social History     Socioeconomic History    Marital status: Single     Spouse name: None    Number of children: None    Years of education: None    Highest education level: None   Occupational History    Occupation: unemployed   Tobacco Use    Smoking status: Former Smoker    Smokeless tobacco: Never Used   Vaping Use    Vaping Use: Never used   Substance and Sexual Activity    Alcohol use: Not Currently    Drug use: Never     Comment: drug use in past- lcd, marijuana, katamine, cocaine    Sexual activity: Yes   Other Topics Concern    None   Social History Narrative    From [place] fluent in both languages     Social Determinants of Health     Financial Resource Strain: Not on file   Food Insecurity: Not on file   Transportation Needs: Not on file   Physical Activity: Not on file   Stress: Not on file   Social Connections: Not on file   Intimate Partner Violence: Not on file   Housing Stability: Not on file Allergies   Allergen Reactions    No Known Allergies             Objective:    /98 (BP Location: Left arm, Patient Position: Sitting, Cuff Size: Large)   Pulse 70   Temp (!) 97 4 °F (36 3 °C) (Tympanic)   Ht 5' 11" (1 803 m)   Wt 112 kg (247 lb 3 2 oz)   SpO2 96%   BMI 34 48 kg/m²        Physical Exam  Constitutional:       General: He is not in acute distress  Appearance: He is well-developed  He is obese  HENT:      Head: Normocephalic and atraumatic  Nose: Nose normal       Mouth/Throat:      Pharynx: No oropharyngeal exudate  Eyes:      General: No scleral icterus  Conjunctiva/sclera: Conjunctivae normal       Pupils: Pupils are equal, round, and reactive to light  Neck:      Thyroid: No thyromegaly  Vascular: No JVD  Trachea: No tracheal deviation  Cardiovascular:      Rate and Rhythm: Normal rate and regular rhythm  Heart sounds: Normal heart sounds  No murmur heard  No friction rub  No gallop  Pulmonary:      Effort: Pulmonary effort is normal  No respiratory distress  Breath sounds: No wheezing or rales  Chest:      Chest wall: No tenderness  Abdominal:      General: Bowel sounds are normal  There is no distension  Palpations: Abdomen is soft  There is no mass  Tenderness: There is no abdominal tenderness  There is no guarding or rebound  Musculoskeletal:         General: No deformity  Normal range of motion  Cervical back: Normal range of motion and neck supple  Lymphadenopathy:      Cervical: No cervical adenopathy  Skin:     General: Skin is warm and dry  Findings: No rash  Neurological:      Mental Status: He is alert and oriented to person, place, and time  Cranial Nerves: No cranial nerve deficit  Coordination: Coordination normal    Psychiatric:         Behavior: Behavior normal          Thought Content:  Thought content normal          Judgment: Judgment normal

## 2022-09-18 DIAGNOSIS — E78.2 MIXED HYPERLIPIDEMIA: ICD-10-CM

## 2022-09-18 DIAGNOSIS — F31.31 BIPOLAR AFFECTIVE DISORDER, CURRENTLY DEPRESSED, MILD (HCC): ICD-10-CM

## 2022-09-18 DIAGNOSIS — E88.81 METABOLIC SYNDROME: ICD-10-CM

## 2022-09-18 DIAGNOSIS — E66.01 SEVERE OBESITY (BMI 35.0-39.9) WITH COMORBIDITY (HCC): ICD-10-CM

## 2022-09-18 DIAGNOSIS — E11.9 TYPE 2 DIABETES MELLITUS TREATED WITHOUT INSULIN (HCC): Primary | ICD-10-CM

## 2022-09-21 DIAGNOSIS — E11.9 TYPE 2 DIABETES MELLITUS WITHOUT COMPLICATION, WITHOUT LONG-TERM CURRENT USE OF INSULIN (HCC): ICD-10-CM

## 2022-11-03 ENCOUNTER — TELEPHONE (OUTPATIENT)
Dept: FAMILY MEDICINE CLINIC | Facility: CLINIC | Age: 47
End: 2022-11-03

## 2022-11-03 ENCOUNTER — OFFICE VISIT (OUTPATIENT)
Dept: FAMILY MEDICINE CLINIC | Facility: CLINIC | Age: 47
End: 2022-11-03

## 2022-11-03 VITALS
HEART RATE: 80 BPM | WEIGHT: 242.6 LBS | HEIGHT: 71 IN | DIASTOLIC BLOOD PRESSURE: 82 MMHG | SYSTOLIC BLOOD PRESSURE: 128 MMHG | OXYGEN SATURATION: 97 % | BODY MASS INDEX: 33.96 KG/M2 | TEMPERATURE: 97.3 F

## 2022-11-03 DIAGNOSIS — F31.31 BIPOLAR AFFECTIVE DISORDER, CURRENTLY DEPRESSED, MILD (HCC): ICD-10-CM

## 2022-11-03 DIAGNOSIS — E78.2 MIXED HYPERLIPIDEMIA: ICD-10-CM

## 2022-11-03 DIAGNOSIS — E66.01 SEVERE OBESITY (BMI 35.0-39.9) WITH COMORBIDITY (HCC): ICD-10-CM

## 2022-11-03 DIAGNOSIS — E88.81 METABOLIC SYNDROME: ICD-10-CM

## 2022-11-03 DIAGNOSIS — E11.9 TYPE 2 DIABETES MELLITUS TREATED WITHOUT INSULIN (HCC): Primary | ICD-10-CM

## 2022-11-03 DIAGNOSIS — E11.9 TYPE 2 DIABETES MELLITUS TREATED WITHOUT INSULIN (HCC): ICD-10-CM

## 2022-11-03 NOTE — TELEPHONE ENCOUNTER
Patient called and states he needs another prior auth for increased dose of Rubelsis 7mg  I advised patient to have pharmacy send over prior auth  Request form

## 2022-11-04 ENCOUNTER — TELEPHONE (OUTPATIENT)
Dept: FAMILY MEDICINE CLINIC | Facility: CLINIC | Age: 47
End: 2022-11-04

## 2022-11-04 NOTE — TELEPHONE ENCOUNTER
Patient left message regarding his medication Semaglutide (Rybelsus) 7 MG TABS, he stated that it needs a prior authorization    Please advise

## 2022-11-04 NOTE — PROGRESS NOTES
Assessment/Plan: This 52-year-old male type 2 diabetes mellitus without insulin recently started Rybelsus 3 mg daily 6 weeks ago  Since that time he has lost 4 lb he and his he he noticeably eating less with less appetite  Initially he had diarrhea and cut his metformin from a 1000 twice daily to 500 twice daily with resolution  He will increase his dose to 7 mg daily and if necessary he can decrease his metformin to 500 once daily or if he has to due to diarrhea he can stop the he metformin  Patient has known bipolar disorder and is on Abilify 10 mg daily zolpidem 5 mg HS Lexapro 20 mg daily with good control  Diet reviewed  Lifestyle modifications reviewed  Medications reviewed and ordered  Laboratory tests and studies reviewed and ordered  All patient's questions answered to patient satisfaction  Chief Complaint   Patient presents with   • Follow-up     6-8 week follow up med check         Diagnoses and all orders for this visit:    Type 2 diabetes mellitus treated without insulin ([facility] )  -     Discontinue: Semaglutide (Rybelsus) 7 MG TABS; Take 3 mg by mouth in the morning  -     CBC and differential; Future  -     Comprehensive metabolic panel; Future  -     LDL cholesterol, direct; Future  -     Triglycerides; Future  -     Microalbumin / creatinine urine ratio; Future  -     Hemoglobin A1C; Future    Bipolar affective disorder, currently depressed, mild (HCC)  -     Discontinue: Semaglutide (Rybelsus) 7 MG TABS; Take 3 mg by mouth in the morning    Severe obesity (BMI 35 0-39  9) with comorbidity (HCC)  -     Discontinue: Semaglutide (Rybelsus) 7 MG TABS; Take 3 mg by mouth in the morning  -     CBC and differential; Future  -     Comprehensive metabolic panel; Future  -     LDL cholesterol, direct; Future  -     Triglycerides; Future  -     Microalbumin / creatinine urine ratio;  Future  -     Hemoglobin A1C; Future    Metabolic syndrome  -     Discontinue: Semaglutide (Rybelsus) 7 MG TABS; Take 3 mg by mouth in the morning  -     CBC and differential; Future  -     Comprehensive metabolic panel; Future  -     LDL cholesterol, direct; Future  -     Triglycerides; Future  -     Microalbumin / creatinine urine ratio; Future  -     Hemoglobin A1C; Future    Mixed hyperlipidemia  -     Discontinue: Semaglutide (Rybelsus) 7 MG TABS; Take 3 mg by mouth in the morning  -     CBC and differential; Future  -     Comprehensive metabolic panel; Future  -     LDL cholesterol, direct; Future  -     Triglycerides; Future  -     Microalbumin / creatinine urine ratio; Future  -     Hemoglobin A1C; Future        Subjective: This 67-year-old male type 2 diabetes mellitus without insulin recently started Rybelsus 3 mg daily 6 weeks ago  Since that time he has lost 4 lb he and his he he noticeably eating less with less appetite  Initially he had diarrhea and cut his metformin from a 1000 twice daily to 500 twice daily with resolution  He will increase his dose to 7 mg daily and if necessary he can decrease his metformin to 500 once daily or if he has to due to diarrhea he can stop the he metformin  Patient has known bipolar disorder and is on Abilify 10 mg daily zolpidem 5 mg HS Lexapro 20 mg daily with good control       Patient ID: Shara Chao is a 56 y o  male  Current Outpatient Medications:   •  ARIPiprazole (ABILIFY) 5 mg tablet, Take 1 tablet by mouth daily , Disp: , Rfl:   •  Biotin 1000 MCG CHEW, Chew in the morning, Disp: , Rfl:   •  Blood Glucose Monitoring Suppl (ONE TOUCH ULTRA 2) w/Device KIT, by Does not apply route daily Test twice daily  , Disp: 1 each, Rfl: 0  •  cetirizine (ZyrTEC) 5 MG tablet, Take 5 mg by mouth daily  , Disp: , Rfl:   •  cholecalciferol (VITAMIN D3) 1,000 units tablet, Take 1,000 Units by mouth every other day, Disp: , Rfl:   •  escitalopram (LEXAPRO) 20 mg tablet, Take 20 mg by mouth daily, Disp: , Rfl:   •  fluticasone (FLONASE) 50 mcg/act nasal spray, 2 sprays into each nostril as needed , Disp: , Rfl:   •  glucose blood test strip, Test twice daily Dx: Diabetes E11 9  one touch ultra 2 test strips, Disp: 200 each, Rfl: 1  •  metFORMIN (GLUCOPHAGE) 500 mg tablet, ONE WITH BREAKFAST, 2 WITH DINNER, Disp: 90 tablet, Rfl: 1  •  Multiple Vitamins-Minerals (CENTRUM SILVER PO), Take by mouth, Disp: , Rfl:   •  omega-3-acid ethyl esters (LOVAZA) 1 g capsule, Take 1 capsule (1 g total) by mouth 2 (two) times a day, Disp: 60 capsule, Rfl: 5  •  rosuvastatin (CRESTOR) 10 MG tablet, Take 1 tablet (10 mg total) by mouth daily, Disp: 90 tablet, Rfl: 3  •  zolpidem (AMBIEN) 10 mg tablet, Take 10 mg by mouth daily at bedtime , Disp: , Rfl:   •  loratadine (CLARITIN) 10 mg tablet, Take 1 tablet by mouth daily  (Patient not taking: No sig reported), Disp: , Rfl:   •  polyethylene glycol (GOLYTELY) 4000 mL solution, Take 4,000 mL by mouth once for 1 dose Take as directed by office, Disp: 4000 mL, Rfl: 0  •  Semaglutide (Rybelsus) 7 MG TABS, Take 7 mg by mouth in the morning, Disp: 30 tablet, Rfl: 4  •  typhoid live vaccine (VIVOTIF) DR capsule, Take one tab PO day 0, 2, 4 & day 6 (Patient not taking: No sig reported), Disp: 4 capsule, Rfl: 0    The following portions of the patient's history were reviewed and updated as appropriate: allergies, current medications, past family history, past medical history, past social history, past surgical history and problem list     Review of Systems   Constitutional: Negative for appetite change, fatigue, fever and unexpected weight change  HENT: Negative for rhinorrhea, sinus pressure, sinus pain, sneezing and sore throat  Eyes: Negative for visual disturbance  Respiratory: Negative for cough, chest tightness, shortness of breath and wheezing  Cardiovascular: Negative for chest pain, palpitations and leg swelling  Gastrointestinal: Negative for abdominal distention, abdominal pain, blood in stool, constipation, diarrhea, nausea and vomiting     Endocrine: Negative for polydipsia and polyuria  Genitourinary: Negative for decreased urine volume, difficulty urinating, dysuria, hematuria and urgency  Musculoskeletal: Negative for arthralgias, back pain, joint swelling and neck pain  Skin: Negative for rash  Allergic/Immunologic: Negative for environmental allergies  Neurological: Negative for tremors, weakness, light-headedness, numbness and headaches  Hematological: Does not bruise/bleed easily  Psychiatric/Behavioral: Negative for agitation, behavioral problems, confusion and dysphoric mood  The patient is not nervous/anxious  Family History   Problem Relation Age of Onset   • No Known Problems Mother    • No Known Problems Father        Past Medical History:   Diagnosis Date   • Allergic    • Anxiety    • Bipolar affective disorder [facility])     sees Dr Ericka Looney   • Depression    • Diabetes mellitus ([facility] )    • Obesity    • Type 2 diabetes mellitus treated without insulin ([facility] ) 11/17/2020       No past surgical history on file      Social History     Socioeconomic History   • Marital status: Single     Spouse name: Not on file   • Number of children: Not on file   • Years of education: Not on file   • Highest education level: Not on file   Occupational History   • Occupation: unemployed   Tobacco Use   • Smoking status: Former Smoker   • Smokeless tobacco: Never Used   Vaping Use   • Vaping Use: Never used   Substance and Sexual Activity   • Alcohol use: Not Currently   • Drug use: Never     Comment: drug use in past- lcd, marijuana, katamine, cocaine   • Sexual activity: Yes   Other Topics Concern   • Not on file   Social History Narrative    From [place] fluent in both languages     Social Determinants of Health     Financial Resource Strain: Not on file   Food Insecurity: Not on file   Transportation Needs: Not on file   Physical Activity: Not on file   Stress: Not on file   Social Connections: Not on file   Intimate Partner Violence: Not on file Housing Stability: Not on file       Allergies   Allergen Reactions   • No Known Allergies             Objective:    /82 (BP Location: Right arm, Patient Position: Sitting, Cuff Size: Large)   Pulse 80   Temp (!) 97 3 °F (36 3 °C) (Tympanic)   Ht 5' 11" (1 803 m)   Wt 110 kg (242 lb 9 6 oz)   SpO2 97%   BMI 33 84 kg/m²        Physical Exam  Constitutional:       General: He is not in acute distress  Appearance: He is well-developed  He is obese  HENT:      Head: Normocephalic and atraumatic  Nose: Nose normal       Mouth/Throat:      Pharynx: No oropharyngeal exudate  Eyes:      General: No scleral icterus  Conjunctiva/sclera: Conjunctivae normal       Pupils: Pupils are equal, round, and reactive to light  Neck:      Thyroid: No thyromegaly  Vascular: No JVD  Trachea: No tracheal deviation  Cardiovascular:      Rate and Rhythm: Normal rate and regular rhythm  Heart sounds: Normal heart sounds  No murmur heard  No friction rub  No gallop  Pulmonary:      Effort: Pulmonary effort is normal  No respiratory distress  Breath sounds: No wheezing or rales  Chest:      Chest wall: No tenderness  Abdominal:      General: Bowel sounds are normal  There is no distension  Palpations: Abdomen is soft  There is no mass  Tenderness: There is no abdominal tenderness  There is no guarding or rebound  Musculoskeletal:         General: No deformity  Normal range of motion  Cervical back: Normal range of motion and neck supple  Lymphadenopathy:      Cervical: No cervical adenopathy  Skin:     General: Skin is warm and dry  Findings: No rash  Neurological:      Mental Status: He is alert and oriented to person, place, and time  Cranial Nerves: No cranial nerve deficit  Coordination: Coordination normal    Psychiatric:         Behavior: Behavior normal          Thought Content:  Thought content normal          Judgment: Judgment normal

## 2022-11-07 DIAGNOSIS — E11.9 TYPE 2 DIABETES MELLITUS TREATED WITHOUT INSULIN (HCC): Primary | ICD-10-CM

## 2022-11-22 ENCOUNTER — VBI (OUTPATIENT)
Dept: ADMINISTRATIVE | Facility: OTHER | Age: 47
End: 2022-11-22

## 2022-12-20 ENCOUNTER — NURSE TRIAGE (OUTPATIENT)
Dept: OTHER | Facility: OTHER | Age: 47
End: 2022-12-20

## 2022-12-20 RX ORDER — SODIUM CHLORIDE 9 MG/ML
125 INJECTION, SOLUTION INTRAVENOUS CONTINUOUS
Status: CANCELLED | OUTPATIENT
Start: 2022-12-20

## 2022-12-20 NOTE — TELEPHONE ENCOUNTER
Regarding: Covid exposure, body aches, congestion, cough, Colonoscopy on 12/22/22  ----- Message from Cristal Harmon sent at 12/20/2022  2:42 PM EST -----  " I have been exposed to covid, some of my family member have tested positive  I have body aches, congestion and a slight cough  I tested negative on the home test that I took   Can I still have the Colonoscopy on Thursday?"

## 2022-12-20 NOTE — TELEPHONE ENCOUNTER
Reason for Disposition  • [1] COVID-19 infection suspected by caller or triager AND [2] mild symptoms (cough, fever, or others) AND [3] negative COVID-19 rapid test    Answer Assessment - Initial Assessment Questions  1  COVID-19 DIAGNOSIS: "Who made your COVID-19 diagnosis?" "Was it confirmed by a positive lab test or self-test?" If not diagnosed by a doctor (or NP/PA), ask "Are there lots of cases (community spread) where you live?" Note: See public health department website, if unsure  negative  2  COVID-19 EXPOSURE: "Was there any known exposure to COVID before the symptoms began?" [facility] Definition of close contact: within 6 feet (2 meters) for a total of 15 minutes or more over a 24-hour period  Sister and niece exposed patient   3  ONSET: "When did the COVID-19 symptoms start?"       Sunday   4  WORST SYMPTOM: "What is your worst symptom?" (e g , cough, fever, shortness of breath, muscle aches)       Body aches, cough and congestion  5  COUGH: "Do you have a cough?" If Yes, ask: "How bad is the cough?"        Dry cough in AM  6  FEVER: "Do you have a fever?" If Yes, ask: "What is your temperature, how was it measured, and when did it start?"      No   7  RESPIRATORY STATUS: "Describe your breathing?" (e g , shortness of breath, wheezing, unable to speak)       Normal   8  BETTER-SAME-WORSE: "Are you getting better, staying the same or getting worse compared to yesterday?"  If getting worse, ask, "In what way?"      Same   9  HIGH RISK DISEASE: "Do you have any chronic medical problems?" (e g , asthma, heart or lung disease, weak immune system, obesity, etc )      Diabetes   10  VACCINE: "Have you had the COVID-19 vaccine?" If Yes, ask: "Which one, how many shots, when did you get it?"        Yes moderna 2x  11  BOOSTER: "Have you received your COVID-19 booster?" If Yes, ask: "Which one and when did you get it?"        Bivalent booster in October 12   PREGNANCY: "Is there any chance you are pregnant?" "When was your last menstrual period?"        No   13  OTHER SYMPTOMS: "Do you have any other symptoms?"  (e g , chills, fatigue, headache, loss of smell or taste, muscle pain, sore throat)        No   14   O2 SATURATION MONITOR:  "Do you use an oxygen saturation monitor (pulse oximeter) at home?" If Yes, ask "What is your reading (oxygen level) today?" "What is your usual oxygen saturation reading?" (e g , 95%)        No    Protocols used: CORONAVIRUS (COVID-19) DIAGNOSED OR SUSPECTED-ADULT-

## 2022-12-20 NOTE — TELEPHONE ENCOUNTER
Patient is scheduled for a colonoscopy on Thursday however he states he was exposed to Covid and has mild symptoms  He tested negative and states he will test again tomorrow AM prior to starting prep  He will call in AM with results

## 2022-12-21 ENCOUNTER — PREP FOR PROCEDURE (OUTPATIENT)
Dept: GASTROENTEROLOGY | Facility: CLINIC | Age: 47
End: 2022-12-21

## 2022-12-21 ENCOUNTER — TELEPHONE (OUTPATIENT)
Dept: GASTROENTEROLOGY | Facility: CLINIC | Age: 47
End: 2022-12-21

## 2022-12-21 DIAGNOSIS — Z12.11 SPECIAL SCREENING FOR MALIGNANT NEOPLASMS, COLON: Primary | ICD-10-CM

## 2022-12-21 NOTE — TELEPHONE ENCOUNTER
Spoke with patient  Patient is Covid negative  Patient has body aches, cough, and congestion  Patient prefers to reschedule  Advised patient staff from the office will call him to reschedule

## 2022-12-21 NOTE — TELEPHONE ENCOUNTER
Pt tested negative again today although he has body aches, congestion and dry cough   He will likely reschedule but request a call back from the nurse    Call back # [phone]

## 2022-12-29 ENCOUNTER — TELEMEDICINE (OUTPATIENT)
Dept: FAMILY MEDICINE CLINIC | Facility: CLINIC | Age: 47
End: 2022-12-29

## 2022-12-29 ENCOUNTER — TELEPHONE (OUTPATIENT)
Dept: FAMILY MEDICINE CLINIC | Facility: CLINIC | Age: 47
End: 2022-12-29

## 2022-12-29 DIAGNOSIS — E11.9 TYPE 2 DIABETES MELLITUS TREATED WITHOUT INSULIN (HCC): ICD-10-CM

## 2022-12-29 DIAGNOSIS — F31.31 BIPOLAR AFFECTIVE DISORDER, CURRENTLY DEPRESSED, MILD (HCC): ICD-10-CM

## 2022-12-29 DIAGNOSIS — E66.01 SEVERE OBESITY (BMI 35.0-39.9) WITH COMORBIDITY (HCC): ICD-10-CM

## 2022-12-29 DIAGNOSIS — U07.1 COVID-19: Primary | ICD-10-CM

## 2022-12-29 NOTE — PROGRESS NOTES
Virtual Regular Visit    Verification of patient location:    Patient is located in the following state in which I hold an active license [place]      Assessment/Plan:  12/26 felt poorly, then fever 102 and body aches, headaches  Test covid positive 12/27  Feeling improved but still febrile 99  More than 50% improved  Patient feels so much better he does not think he needs an antiviral and I agree especially since Paxil COVID interacts with his aripiprazole in an unpredictable fashion  We discussed this  We will continue with Tylenol as needed  Increase fluids  His usual other medications  Problem List Items Addressed This Visit        Endocrine    Type 2 diabetes mellitus treated without insulin ([facility] )       Other    Bipolar affective disorder, currently depressed, mild (HCC)    Severe obesity (BMI 35 0-39  9) with comorbidity ([facility] [place] [facility] )   Other Visit Diagnoses     COVID-19    -  Primary               Reason for visit is   Chief Complaint   Patient presents with   • COVID-19     Pt tested positive for covid  • Virtual Regular Visit        Encounter provider Cabrera Bai MD    Provider located at [address] [place] [address], [facility] [address] [facility] , [address] [place] [address] [phone]      Recent Visits  No visits were found meeting these conditions  Showing recent visits within past 7 days and meeting all other requirements  Today's Visits  Date Type Provider Dept   12/29/22 Telemedicine Cabrera Bai MD Pg  Primary Care [facility]   12/29/22 Telephone [facility] today's visits and meeting all other requirements  Future Appointments  No visits were found meeting these conditions  Showing future appointments within next 150 days and meeting all other requirements       The patient was identified by name and date of birth   Jennifer Hugh was informed that this is a telemedicine visit and that the visit is being conducted through [employer] and patient was informed that this is not a secure platform He agrees to proceed     My office door was closed  No one else was in the room  He acknowledged consent and understanding of privacy and security of the video platform  The patient has agreed to participate and understands they can discontinue the visit at any time  Patient is aware this is a billable service  Subjective  Eyad Diaz is a 56 y o  male with COVID-19       12/26 felt poorly, then fever 102 and body aches, headaches  Test covid positive 12/27  Feeling improved but still febrile 99  More than 50% improved  Patient feels so much better he does not think he needs an antiviral and I agree especially since Paxvenancio LACYID interacts with his aripiprazole in an unpredictable fashion  We discussed this  We will continue with Tylenol as needed  Increase fluids  His usual other medications  Past Medical History:   Diagnosis Date   • Allergic    • Anxiety    • Bipolar affective disorder [facility])     sees Dr Sylvie Lester   • Depression    • Diabetes mellitus ([facility] )    • Obesity    • Type 2 diabetes mellitus treated without insulin ([facility] ) 11/17/2020       History reviewed  No pertinent surgical history  Current Outpatient Medications   Medication Sig Dispense Refill   • ARIPiprazole (ABILIFY) 5 mg tablet Take 1 tablet by mouth daily      • Biotin 1000 MCG CHEW Chew in the morning     • Blood Glucose Monitoring Suppl (ONE TOUCH ULTRA 2) w/Device KIT by Does not apply route daily Test twice daily   1 each 0   • cetirizine (ZyrTEC) 5 MG tablet Take 5 mg by mouth daily       • cholecalciferol (VITAMIN D3) 1,000 units tablet Take 1,000 Units by mouth every other day     • escitalopram (LEXAPRO) 20 mg tablet Take 20 mg by mouth daily     • fluticasone (FLONASE) 50 mcg/act nasal spray 2 sprays into each nostril as needed      • glucose blood test strip Test twice daily Dx: Diabetes E11 9  one touch ultra 2 test strips 200 each 1 • loratadine (CLARITIN) 10 mg tablet Take 1 tablet by mouth daily  (Patient not taking: No sig reported)     • metFORMIN (GLUCOPHAGE) 500 mg tablet ONE WITH BREAKFAST, 2 WITH DINNER 90 tablet 1   • Multiple Vitamins-Minerals (CENTRUM SILVER PO) Take by mouth     • omega-3-acid ethyl esters (LOVAZA) 1 g capsule Take 1 capsule (1 g total) by mouth 2 (two) times a day 60 capsule 5   • polyethylene glycol (GOLYTELY) 4000 mL solution Take 4,000 mL by mouth once for 1 dose Take as directed by office 4000 mL 0   • rosuvastatin (CRESTOR) 10 MG tablet Take 1 tablet (10 mg total) by mouth daily 90 tablet 3   • Semaglutide (Rybelsus) 7 MG TABS Take 7 mg by mouth in the morning 30 tablet 4   • Semaglutide 3 MG TABS Take 1 tablet by mouth daily 90 tablet 3   • typhoid live vaccine (VIVOTIF) DR capsule Take one tab PO day 0, 2, 4 & day 6 (Patient not taking: No sig reported) 4 capsule 0   • zolpidem (AMBIEN) 10 mg tablet Take 10 mg by mouth daily at bedtime        No current facility-administered medications for this visit  Allergies   Allergen Reactions   • No Known Allergies        Review of Systems   Constitutional: Positive for fatigue and fever  Negative for appetite change and unexpected weight change  HENT: Negative for rhinorrhea, sinus pressure, sinus pain, sneezing and sore throat  Eyes: Negative for visual disturbance  Respiratory: Negative for cough, chest tightness, shortness of breath and wheezing  Cardiovascular: Negative for chest pain, palpitations and leg swelling  Gastrointestinal: Negative for abdominal distention, abdominal pain, blood in stool, constipation, diarrhea, nausea and vomiting  Endocrine: Negative for polydipsia and polyuria  Genitourinary: Negative for decreased urine volume, difficulty urinating, dysuria, hematuria and urgency  Musculoskeletal: Positive for myalgias  Negative for arthralgias, back pain, joint swelling and neck pain  Skin: Negative for rash  Allergic/Immunologic: Negative for environmental allergies  Neurological: Negative for tremors, weakness, light-headedness, numbness and headaches  Hematological: Does not bruise/bleed easily  Psychiatric/Behavioral: Negative for agitation, behavioral problems, confusion and dysphoric mood  The patient is not nervous/anxious  Video Exam    There were no vitals filed for this visit  Physical Exam  Constitutional:       General: He is not in acute distress  Appearance: He is well-developed  HENT:      Head: Normocephalic and atraumatic  Nose: Nose normal       Mouth/Throat:      Pharynx: No oropharyngeal exudate  Eyes:      General: No scleral icterus  Conjunctiva/sclera: Conjunctivae normal       Pupils: Pupils are equal, round, and reactive to light  Neck:      Thyroid: No thyromegaly  Vascular: No JVD  Trachea: No tracheal deviation  Cardiovascular:      Heart sounds: No murmur heard  No friction rub  No gallop  Pulmonary:      Effort: Pulmonary effort is normal  No respiratory distress  Breath sounds: No wheezing or rales  Chest:      Chest wall: No tenderness  Abdominal:      General: There is no distension  Palpations: Abdomen is soft  There is no mass  Tenderness: There is no abdominal tenderness  There is no guarding or rebound  Musculoskeletal:         General: No deformity  Normal range of motion  Cervical back: Normal range of motion  Lymphadenopathy:      Cervical: No cervical adenopathy  Skin:     General: Skin is warm and dry  Findings: No rash  Neurological:      Mental Status: He is alert and oriented to person, place, and time  Cranial Nerves: No cranial nerve deficit  Coordination: Coordination normal    Psychiatric:         Behavior: Behavior normal          Thought Content:  Thought content normal          Judgment: Judgment normal           I spent 20 minutes directly with the patient during this visit

## 2023-02-18 ENCOUNTER — APPOINTMENT (OUTPATIENT)
Dept: LAB | Facility: HOSPITAL | Age: 48
End: 2023-02-18

## 2023-02-18 DIAGNOSIS — E66.01 SEVERE OBESITY (BMI 35.0-39.9) WITH COMORBIDITY (HCC): ICD-10-CM

## 2023-02-18 DIAGNOSIS — E11.9 TYPE 2 DIABETES MELLITUS TREATED WITHOUT INSULIN (HCC): ICD-10-CM

## 2023-02-18 DIAGNOSIS — E88.81 METABOLIC SYNDROME: ICD-10-CM

## 2023-02-18 DIAGNOSIS — E78.2 MIXED HYPERLIPIDEMIA: ICD-10-CM

## 2023-02-18 LAB
ALBUMIN SERPL BCP-MCNC: 3.8 G/DL (ref 3.5–5)
ALP SERPL-CCNC: 72 U/L (ref 46–116)
ALT SERPL W P-5'-P-CCNC: 43 U/L (ref 12–78)
ANION GAP SERPL CALCULATED.3IONS-SCNC: 8 MMOL/L (ref 4–13)
AST SERPL W P-5'-P-CCNC: 25 U/L (ref 5–45)
BASOPHILS # BLD AUTO: 0.05 THOUSANDS/ÂΜL (ref 0–0.1)
BASOPHILS NFR BLD AUTO: 1 % (ref 0–1)
BILIRUB SERPL-MCNC: 0.66 MG/DL (ref 0.2–1)
BUN SERPL-MCNC: 17 MG/DL (ref 5–25)
CALCIUM SERPL-MCNC: 9 MG/DL (ref 8.3–10.1)
CHLORIDE SERPL-SCNC: 102 MMOL/L (ref 96–108)
CO2 SERPL-SCNC: 28 MMOL/L (ref 21–32)
CREAT SERPL-MCNC: 0.92 MG/DL (ref 0.6–1.3)
CREAT UR-MCNC: 139 MG/DL
EOSINOPHIL # BLD AUTO: 0.25 THOUSAND/ÂΜL (ref 0–0.61)
EOSINOPHIL NFR BLD AUTO: 4 % (ref 0–6)
ERYTHROCYTE [DISTWIDTH] IN BLOOD BY AUTOMATED COUNT: 16.9 % (ref 11.6–15.1)
GFR SERPL CREATININE-BSD FRML MDRD: 99 ML/MIN/1.73SQ M
GLUCOSE P FAST SERPL-MCNC: 115 MG/DL (ref 65–99)
HCT VFR BLD AUTO: 47.4 % (ref 36.5–49.3)
HGB BLD-MCNC: 14.5 G/DL (ref 12–17)
IMM GRANULOCYTES # BLD AUTO: 0.01 THOUSAND/UL (ref 0–0.2)
IMM GRANULOCYTES NFR BLD AUTO: 0 % (ref 0–2)
LDLC SERPL DIRECT ASSAY-MCNC: 110 MG/DL (ref 0–100)
LYMPHOCYTES # BLD AUTO: 2.53 THOUSANDS/ÂΜL (ref 0.6–4.47)
LYMPHOCYTES NFR BLD AUTO: 40 % (ref 14–44)
MCH RBC QN AUTO: 24.2 PG (ref 26.8–34.3)
MCHC RBC AUTO-ENTMCNC: 30.6 G/DL (ref 31.4–37.4)
MCV RBC AUTO: 79 FL (ref 82–98)
MICROALBUMIN UR-MCNC: 6.1 MG/L (ref 0–20)
MICROALBUMIN/CREAT 24H UR: 4 MG/G CREATININE (ref 0–30)
MONOCYTES # BLD AUTO: 0.49 THOUSAND/ÂΜL (ref 0.17–1.22)
MONOCYTES NFR BLD AUTO: 8 % (ref 4–12)
NEUTROPHILS # BLD AUTO: 2.94 THOUSANDS/ÂΜL (ref 1.85–7.62)
NEUTS SEG NFR BLD AUTO: 47 % (ref 43–75)
NRBC BLD AUTO-RTO: 0 /100 WBCS
PLATELET # BLD AUTO: 268 THOUSANDS/UL (ref 149–390)
PMV BLD AUTO: 11.7 FL (ref 8.9–12.7)
POTASSIUM SERPL-SCNC: 4.3 MMOL/L (ref 3.5–5.3)
PROT SERPL-MCNC: 7.3 G/DL (ref 6.4–8.4)
RBC # BLD AUTO: 6 MILLION/UL (ref 3.88–5.62)
SODIUM SERPL-SCNC: 138 MMOL/L (ref 135–147)
TRIGL SERPL-MCNC: 147 MG/DL
WBC # BLD AUTO: 6.27 THOUSAND/UL (ref 4.31–10.16)

## 2023-02-20 LAB
EST. AVERAGE GLUCOSE BLD GHB EST-MCNC: 128 MG/DL
HBA1C MFR BLD: 6.1 %

## 2023-02-22 DIAGNOSIS — E78.2 MIXED HYPERLIPIDEMIA: ICD-10-CM

## 2023-02-22 RX ORDER — ROSUVASTATIN CALCIUM 10 MG/1
TABLET, COATED ORAL
Qty: 90 TABLET | Refills: 3 | Status: SHIPPED | OUTPATIENT
Start: 2023-02-22

## 2023-02-23 ENCOUNTER — OFFICE VISIT (OUTPATIENT)
Dept: FAMILY MEDICINE CLINIC | Facility: CLINIC | Age: 48
End: 2023-02-23

## 2023-02-23 VITALS
BODY MASS INDEX: 32.9 KG/M2 | DIASTOLIC BLOOD PRESSURE: 78 MMHG | HEIGHT: 71 IN | RESPIRATION RATE: 16 BRPM | SYSTOLIC BLOOD PRESSURE: 128 MMHG | HEART RATE: 59 BPM | WEIGHT: 235 LBS | OXYGEN SATURATION: 99 % | TEMPERATURE: 96.7 F

## 2023-02-23 DIAGNOSIS — E11.9 TYPE 2 DIABETES MELLITUS TREATED WITHOUT INSULIN (HCC): Primary | ICD-10-CM

## 2023-02-23 DIAGNOSIS — E73.9 LACTOSE INTOLERANCE: ICD-10-CM

## 2023-02-23 DIAGNOSIS — E88.81 METABOLIC SYNDROME: ICD-10-CM

## 2023-02-23 DIAGNOSIS — D56.3 THALASSEMIA TRAIT: ICD-10-CM

## 2023-02-23 DIAGNOSIS — E66.01 SEVERE OBESITY (BMI 35.0-39.9) WITH COMORBIDITY (HCC): ICD-10-CM

## 2023-02-23 DIAGNOSIS — E78.2 MIXED HYPERLIPIDEMIA: ICD-10-CM

## 2023-02-23 DIAGNOSIS — F31.31 BIPOLAR AFFECTIVE DISORDER, CURRENTLY DEPRESSED, MILD (HCC): ICD-10-CM

## 2023-02-23 NOTE — PROGRESS NOTES
Assessment/Plan: This 15-year-old male is now on Rybelsus 7 mg daily having started at 3 mg in December  He has lost 10 to 15 pounds  He has decreased his Caloric intake from greater than 2500 ankit a day to 1500 ankit a day  His hemoglobin A1c is down to 6 1 and his fasting blood sugar is 114  His postprandial 2-hour sugars at home are generally less than 140  His triglycerides have come down to 149 and his direct LDL went up to 110 on 10 mg of rosuvastatin  In a few weeks after he comes back from vacation he will increase his Rybelsus to 14 mg daily  He has had no side effects on 7 mg daily  He is off metformin  After he stabilizes on the 14 mg dose he can repeat his LDL and triglycerides and likely will will need an increase in his rosuvastatin  He had a coronary calcium count which was 0  Diet reviewed  Lifestyle modifications reviewed  Medications reviewed and ordered  Laboratory tests and studies reviewed and ordered  All patient's questions answered to patient satisfaction  Chief Complaint   Patient presents with   • Follow-up     Pt would like to go over lab results  DM foot exam done today  Colorectal screen due  BMI follow up due  Diagnoses and all orders for this visit:    Type 2 diabetes mellitus treated without insulin ([facility] [address] )  -     Discontinue: semaglutide (Rybelsus) 14 MG tablet; Take 1 tablet (14 mg total) by mouth in the morning  -     semaglutide (Rybelsus) 14 MG tablet; Take 1 tablet (14 mg total) by mouth in the morning    Bipolar affective disorder, currently depressed, mild (HCC)  -     Discontinue: semaglutide (Rybelsus) 14 MG tablet; Take 1 tablet (14 mg total) by mouth in the morning  -     semaglutide (Rybelsus) 14 MG tablet; Take 1 tablet (14 mg total) by mouth in the morning    Lactose intolerance    Metabolic syndrome  -     Discontinue: semaglutide (Rybelsus) 14 MG tablet;  Take 1 tablet (14 mg total) by mouth in the morning  -     semaglutide (Rybelsus) 14 MG tablet; Take 1 tablet (14 mg total) by mouth in the morning    Mixed hyperlipidemia  -     Discontinue: semaglutide (Rybelsus) 14 MG tablet; Take 1 tablet (14 mg total) by mouth in the morning  -     semaglutide (Rybelsus) 14 MG tablet; Take 1 tablet (14 mg total) by mouth in the morning    Severe obesity (BMI 35 0-39  9) with comorbidity ([address] )  -     Discontinue: semaglutide (Rybelsus) 14 MG tablet; Take 1 tablet (14 mg total) by mouth in the morning  -     semaglutide (Rybelsus) 14 MG tablet; Take 1 tablet (14 mg total) by mouth in the morning    Thalassemia trait        Subjective:     HPI     Patient ID: Dayron Osorio is a 56 y o  male  Current Outpatient Medications:   •  ARIPiprazole (ABILIFY) 5 mg tablet, Take 1 tablet by mouth daily , Disp: , Rfl:   •  Biotin 1000 MCG CHEW, Chew in the morning, Disp: , Rfl:   •  Blood Glucose Monitoring Suppl (ONE TOUCH ULTRA 2) w/Device KIT, by Does not apply route daily Test twice daily  , Disp: 1 each, Rfl: 0  •  cetirizine (ZyrTEC) 5 MG tablet, Take 5 mg by mouth daily  , Disp: , Rfl:   •  cholecalciferol (VITAMIN D3) 1,000 units tablet, Take 1,000 Units by mouth every other day, Disp: , Rfl:   •  escitalopram (LEXAPRO) 20 mg tablet, Take 20 mg by mouth daily, Disp: , Rfl:   •  fluticasone (FLONASE) 50 mcg/act nasal spray, 2 sprays into each nostril as needed , Disp: , Rfl:   •  glucose blood test strip, Test twice daily Dx: Diabetes E11 9  one touch ultra 2 test strips, Disp: 200 each, Rfl: 1  •  Multiple Vitamins-Minerals (CENTRUM SILVER PO), Take by mouth, Disp: , Rfl:   •  omega-3-acid ethyl esters (LOVAZA) 1 g capsule, Take 1 capsule (1 g total) by mouth 2 (two) times a day, Disp: 60 capsule, Rfl: 5  •  rosuvastatin (CRESTOR) 10 MG tablet, TAKE 1 TABLET BY MOUTH EVERY DAY, Disp: 90 tablet, Rfl: 3  •  semaglutide (Rybelsus) 14 MG tablet, Take 1 tablet (14 mg total) by mouth in the morning, Disp: 30 tablet, Rfl: 11  •  zolpidem (AMBIEN) 10 mg tablet, Take 10 mg by mouth daily at bedtime , Disp: , Rfl:   •  loratadine (CLARITIN) 10 mg tablet, Take 1 tablet by mouth daily  (Patient not taking: Reported on 9/2/2021), Disp: , Rfl:   •  polyethylene glycol (GOLYTELY) 4000 mL solution, Take 4,000 mL by mouth once for 1 dose Take as directed by office, Disp: 4000 mL, Rfl: 0    The following portions of the patient's history were reviewed and updated as appropriate: allergies, current medications, past family history, past medical history, past social history, past surgical history and problem list     Review of Systems   Constitutional: Negative for appetite change, fatigue, fever and unexpected weight change  HENT: Negative for rhinorrhea, sinus pressure, sinus pain, sneezing and sore throat  Eyes: Negative for visual disturbance  Respiratory: Negative for cough, chest tightness, shortness of breath and wheezing  Cardiovascular: Negative for chest pain, palpitations and leg swelling  Gastrointestinal: Negative for abdominal distention, abdominal pain, blood in stool, constipation, diarrhea, nausea and vomiting  Endocrine: Negative for polydipsia and polyuria  Genitourinary: Negative for decreased urine volume, difficulty urinating, dysuria, hematuria and urgency  Musculoskeletal: Negative for arthralgias, back pain, joint swelling and neck pain  Skin: Negative for rash  Allergic/Immunologic: Negative for environmental allergies  Neurological: Negative for tremors, weakness, light-headedness, numbness and headaches  Hematological: Does not bruise/bleed easily  Psychiatric/Behavioral: Negative for agitation, behavioral problems, confusion and dysphoric mood  The patient is not nervous/anxious            Family History   Problem Relation Age of Onset   • No Known Problems Mother    • No Known Problems Father        Past Medical History:   Diagnosis Date   • Allergic    • Anxiety    • Bipolar affective disorder [facility])     sees Dr Crystal Ron   • Depression    • Diabetes mellitus ([facility] )    • Obesity    • Type 2 diabetes mellitus treated without insulin ([facility] ) 11/17/2020       History reviewed  No pertinent surgical history  Social History     Socioeconomic History   • Marital status: Single     Spouse name: None   • Number of children: None   • Years of education: None   • Highest education level: None   Occupational History   • Occupation: unemployed   Tobacco Use   • Smoking status: Former   • Smokeless tobacco: Never   Vaping Use   • Vaping Use: Never used   Substance and Sexual Activity   • Alcohol use: Not Currently   • Drug use: Never     Comment: drug use in past- lcd, marijuana, katamine, cocaine   • Sexual activity: Yes   Other Topics Concern   • None   Social History Narrative    From [place] fluent in both languages     Social Determinants of Health     Financial Resource Strain: Not on file   Food Insecurity: Not on file   Transportation Needs: Not on file   Physical Activity: Not on file   Stress: Not on file   Social Connections: Not on file   Intimate Partner Violence: Not on file   Housing Stability: Not on file       Allergies   Allergen Reactions   • No Known Allergies        BMI Counseling: Body mass index is 32 78 kg/m²  The BMI is above normal  Nutrition recommendations include decreasing portion sizes, encouraging healthy choices of fruits and vegetables, moderation in carbohydrate intake, increasing intake of lean protein, reducing intake of saturated and trans fat and reducing intake of cholesterol  Exercise recommendations include exercising 3-5 times per week  No pharmacotherapy was ordered  Patient referred to PCP  Rationale for BMI follow-up plan is due to patient being overweight or obese           Objective:    /78 (BP Location: Left arm, Patient Position: Sitting, Cuff Size: Large)   Pulse 59   Temp (!) 96 7 °F (35 9 °C) (Tympanic)   Resp 16   Ht 5' 11" (1 803 m)   Wt 107 kg (235 lb)   SpO2 99%   BMI 32 78 kg/m²        Physical Exam  Constitutional:       General: He is not in acute distress  Appearance: He is well-developed  HENT:      Head: Normocephalic and atraumatic  Nose: Nose normal       Mouth/Throat:      Pharynx: No oropharyngeal exudate  Eyes:      General: No scleral icterus  Conjunctiva/sclera: Conjunctivae normal       Pupils: Pupils are equal, round, and reactive to light  Neck:      Thyroid: No thyromegaly  Vascular: No JVD  Trachea: No tracheal deviation  Cardiovascular:      Rate and Rhythm: Normal rate and regular rhythm  Heart sounds: Normal heart sounds  No murmur heard  No friction rub  No gallop  Pulmonary:      Effort: Pulmonary effort is normal  No respiratory distress  Breath sounds: No wheezing or rales  Chest:      Chest wall: No tenderness  Abdominal:      General: Bowel sounds are normal  There is no distension  Palpations: Abdomen is soft  There is no mass  Tenderness: There is no abdominal tenderness  There is no guarding or rebound  Musculoskeletal:         General: No deformity  Normal range of motion  Cervical back: Normal range of motion and neck supple  Lymphadenopathy:      Cervical: No cervical adenopathy  Skin:     General: Skin is warm and dry  Findings: No rash  Neurological:      Mental Status: He is alert and oriented to person, place, and time  Cranial Nerves: No cranial nerve deficit  Coordination: Coordination normal    Psychiatric:         Behavior: Behavior normal          Thought Content:  Thought content normal          Judgment: Judgment normal

## 2023-02-24 ENCOUNTER — VBI (OUTPATIENT)
Dept: ADMINISTRATIVE | Facility: OTHER | Age: 48
End: 2023-02-24

## 2023-02-24 NOTE — TELEPHONE ENCOUNTER
02/24/23 1:49 PM     [employer] SmartForm used to document caregap status      Steffany Damian  used

## 2023-03-16 ENCOUNTER — OFFICE VISIT (OUTPATIENT)
Dept: FAMILY MEDICINE CLINIC | Facility: CLINIC | Age: 48
End: 2023-03-16

## 2023-03-16 ENCOUNTER — APPOINTMENT (OUTPATIENT)
Dept: LAB | Facility: CLINIC | Age: 48
End: 2023-03-16

## 2023-03-16 ENCOUNTER — APPOINTMENT (OUTPATIENT)
Dept: LAB | Facility: MEDICAL CENTER | Age: 48
End: 2023-03-16

## 2023-03-16 VITALS
WEIGHT: 235.8 LBS | OXYGEN SATURATION: 98 % | BODY MASS INDEX: 33.01 KG/M2 | TEMPERATURE: 98.1 F | DIASTOLIC BLOOD PRESSURE: 90 MMHG | HEIGHT: 71 IN | HEART RATE: 70 BPM | SYSTOLIC BLOOD PRESSURE: 128 MMHG

## 2023-03-16 DIAGNOSIS — E11.9 TYPE 2 DIABETES MELLITUS TREATED WITHOUT INSULIN (HCC): ICD-10-CM

## 2023-03-16 DIAGNOSIS — A09 TRAVELER'S DIARRHEA: Primary | ICD-10-CM

## 2023-03-16 DIAGNOSIS — A09 TRAVELER'S DIARRHEA: ICD-10-CM

## 2023-03-16 LAB
ALBUMIN SERPL BCP-MCNC: 3.9 G/DL (ref 3.5–5)
ALP SERPL-CCNC: 68 U/L (ref 46–116)
ALT SERPL W P-5'-P-CCNC: 36 U/L (ref 12–78)
ANION GAP SERPL CALCULATED.3IONS-SCNC: 3 MMOL/L (ref 4–13)
AST SERPL W P-5'-P-CCNC: 29 U/L (ref 5–45)
BASOPHILS # BLD AUTO: 0.06 THOUSANDS/ÂΜL (ref 0–0.1)
BASOPHILS NFR BLD AUTO: 1 % (ref 0–1)
BILIRUB SERPL-MCNC: 0.45 MG/DL (ref 0.2–1)
BUN SERPL-MCNC: 15 MG/DL (ref 5–25)
CALCIUM SERPL-MCNC: 9.5 MG/DL (ref 8.3–10.1)
CHLORIDE SERPL-SCNC: 106 MMOL/L (ref 96–108)
CO2 SERPL-SCNC: 28 MMOL/L (ref 21–32)
CREAT SERPL-MCNC: 1 MG/DL (ref 0.6–1.3)
EOSINOPHIL # BLD AUTO: 0.34 THOUSAND/ÂΜL (ref 0–0.61)
EOSINOPHIL NFR BLD AUTO: 5 % (ref 0–6)
ERYTHROCYTE [DISTWIDTH] IN BLOOD BY AUTOMATED COUNT: 15.3 % (ref 11.6–15.1)
GFR SERPL CREATININE-BSD FRML MDRD: 89 ML/MIN/1.73SQ M
GLUCOSE SERPL-MCNC: 101 MG/DL (ref 65–140)
HCT VFR BLD AUTO: 48.8 % (ref 36.5–49.3)
HGB BLD-MCNC: 14.6 G/DL (ref 12–17)
IMM GRANULOCYTES # BLD AUTO: 0.01 THOUSAND/UL (ref 0–0.2)
IMM GRANULOCYTES NFR BLD AUTO: 0 % (ref 0–2)
LYMPHOCYTES # BLD AUTO: 2.97 THOUSANDS/ÂΜL (ref 0.6–4.47)
LYMPHOCYTES NFR BLD AUTO: 39 % (ref 14–44)
MCH RBC QN AUTO: 23.9 PG (ref 26.8–34.3)
MCHC RBC AUTO-ENTMCNC: 29.9 G/DL (ref 31.4–37.4)
MCV RBC AUTO: 80 FL (ref 82–98)
MONOCYTES # BLD AUTO: 0.68 THOUSAND/ÂΜL (ref 0.17–1.22)
MONOCYTES NFR BLD AUTO: 9 % (ref 4–12)
NEUTROPHILS # BLD AUTO: 3.56 THOUSANDS/ÂΜL (ref 1.85–7.62)
NEUTS SEG NFR BLD AUTO: 46 % (ref 43–75)
NRBC BLD AUTO-RTO: 0 /100 WBCS
PLATELET # BLD AUTO: 281 THOUSANDS/UL (ref 149–390)
PMV BLD AUTO: 11.4 FL (ref 8.9–12.7)
POTASSIUM SERPL-SCNC: 4.3 MMOL/L (ref 3.5–5.3)
PROT SERPL-MCNC: 7.4 G/DL (ref 6.4–8.4)
RBC # BLD AUTO: 6.1 MILLION/UL (ref 3.88–5.62)
SODIUM SERPL-SCNC: 137 MMOL/L (ref 135–147)
WBC # BLD AUTO: 7.62 THOUSAND/UL (ref 4.31–10.16)

## 2023-03-16 RX ORDER — CIPROFLOXACIN 500 MG/1
500 TABLET, FILM COATED ORAL EVERY 12 HOURS SCHEDULED
Qty: 14 TABLET | Refills: 0 | Status: SHIPPED | OUTPATIENT
Start: 2023-03-16 | End: 2023-03-23

## 2023-03-16 NOTE — PROGRESS NOTES
Assessment/Plan: This 80-year-old male recently returned from a trip to [facility] for 12 days  He was very careful about what he ate but on the day prior to coming home he ate street food  Upon returning he developed loose mushy diarrhea 4-6 bowel movements daily occurring within 15 minutes of eating  He has not lost any weight and he is maintaining his hydration well  He has a history of lactose intolerance and is attempting to avoid milk products  He will stay on the brat diet  He never increase his Rybelsus and is still taking 7 mg daily  He will stop the Rybelsus until his bowel movements normalize  He will take Cipro 500 twice daily after getting his stool tests at the lab and take it for 7 days  Diet reviewed  Lifestyle modifications reviewed  Medications reviewed and ordered  Laboratory tests and studies reviewed and ordered  All patient's questions answered to patient satisfaction  Chief Complaint   Patient presents with   • Gas     Pt c/o of flatulence and belching  • GI Problem     Patient is here following a trip to [facility], [facility], and [facility]  For the past four days, the pt is complaining of increased flatulence, and urgency to defecate within 15 minutes after eating  Diagnoses and all orders for this visit:    Traveler's diarrhea  -     Stool culture; Future  -     Stool Enteric Bacterial Panel by PCR; Future  -     Fecal leukocytes; Future  -     Giardia antigen; Future  -     Ova and Parasites, Conc/Perm Smear, 3 Spec; Future  -     CBC and differential; Future  -     Comprehensive metabolic panel; Future  -     ciprofloxacin (CIPRO) 500 mg tablet; Take 1 tablet (500 mg total) by mouth every 12 (twelve) hours for 7 days    Type 2 diabetes mellitus treated without insulin (HCC)  -     Stool culture; Future  -     Stool Enteric Bacterial Panel by PCR; Future  -     Fecal leukocytes; Future  -     Giardia antigen;  Future  -     Ova and Parasites, Conc/Perm Smear, 3 Spec; Future  -     CBC and differential; Future  -     Comprehensive metabolic panel; Future  -     ciprofloxacin (CIPRO) 500 mg tablet; Take 1 tablet (500 mg total) by mouth every 12 (twelve) hours for 7 days      Subjective: This 50-year-old male recently returned from a trip to [facility] for 12 days  He was very careful about what he ate but on the day prior to coming home he ate street food  Upon returning he developed loose mushy diarrhea 4-6 bowel movements daily occurring within 15 minutes of eating  He has not lost any weight and he is maintaining his hydration well  He has a history of lactose intolerance and is attempting to avoid milk products  He will stay on the brat diet  He never increase his Rybelsus and is still taking 7 mg daily  He will stop the Rybelsus until his bowel movements normalize  He will take Cipro 500 twice daily after getting his stool tests at the lab and take it for 7 days  GI Problem  The primary symptoms include diarrhea  Primary symptoms do not include fever, fatigue, abdominal pain, nausea, vomiting, dysuria, arthralgias or rash  The illness does not include constipation or back pain  Patient ID: Jamie Atwood is a 53 y o  male  Current Outpatient Medications:   •  ARIPiprazole (ABILIFY) 5 mg tablet, Take 1 tablet by mouth daily , Disp: , Rfl:   •  Biotin 1000 MCG CHEW, Chew in the morning, Disp: , Rfl:   •  Blood Glucose Monitoring Suppl (ONE TOUCH ULTRA 2) w/Device KIT, by Does not apply route daily Test twice daily  , Disp: 1 each, Rfl: 0  •  cetirizine (ZyrTEC) 5 MG tablet, Take 5 mg by mouth daily  , Disp: , Rfl:   •  cholecalciferol (VITAMIN D3) 1,000 units tablet, Take 1,000 Units by mouth every other day, Disp: , Rfl:   •  ciprofloxacin (CIPRO) 500 mg tablet, Take 1 tablet (500 mg total) by mouth every 12 (twelve) hours for 7 days, Disp: 14 tablet, Rfl: 0  •  escitalopram (LEXAPRO) 20 mg tablet, Take 20 mg by mouth daily, Disp: , Rfl:   • fluticasone (FLONASE) 50 mcg/act nasal spray, 2 sprays into each nostril as needed , Disp: , Rfl:   •  glucose blood test strip, Test twice daily Dx: Diabetes E11 9  one touch ultra 2 test strips, Disp: 200 each, Rfl: 1  •  Multiple Vitamins-Minerals (CENTRUM SILVER PO), Take by mouth, Disp: , Rfl:   •  omega-3-acid ethyl esters (LOVAZA) 1 g capsule, Take 1 capsule (1 g total) by mouth 2 (two) times a day, Disp: 60 capsule, Rfl: 5  •  rosuvastatin (CRESTOR) 10 MG tablet, TAKE 1 TABLET BY MOUTH EVERY DAY, Disp: 90 tablet, Rfl: 3  •  semaglutide (Rybelsus) 14 MG tablet, Take 1 tablet (14 mg total) by mouth in the morning, Disp: 30 tablet, Rfl: 11  •  zolpidem (AMBIEN) 10 mg tablet, Take 10 mg by mouth daily at bedtime , Disp: , Rfl:   •  loratadine (CLARITIN) 10 mg tablet, Take 1 tablet by mouth daily  (Patient not taking: Reported on 9/2/2021), Disp: , Rfl:   •  polyethylene glycol (GOLYTELY) 4000 mL solution, Take 4,000 mL by mouth once for 1 dose Take as directed by office, Disp: 4000 mL, Rfl: 0    The following portions of the patient's history were reviewed and updated as appropriate: allergies, current medications, past family history, past medical history, past social history, past surgical history and problem list     Review of Systems   Constitutional: Negative for appetite change, fatigue, fever and unexpected weight change  HENT: Negative for rhinorrhea, sinus pressure, sinus pain, sneezing and sore throat  Eyes: Negative for visual disturbance  Respiratory: Negative for cough, chest tightness, shortness of breath and wheezing  Cardiovascular: Negative for chest pain, palpitations and leg swelling  Gastrointestinal: Positive for diarrhea  Negative for abdominal distention, abdominal pain, blood in stool, constipation, nausea and vomiting  Endocrine: Negative for polydipsia and polyuria  Genitourinary: Negative for decreased urine volume, difficulty urinating, dysuria, hematuria and urgency  Musculoskeletal: Negative for arthralgias, back pain, joint swelling and neck pain  Skin: Negative for rash  Allergic/Immunologic: Negative for environmental allergies  Neurological: Negative for tremors, weakness, light-headedness, numbness and headaches  Hematological: Does not bruise/bleed easily  Psychiatric/Behavioral: Negative for agitation, behavioral problems, confusion and dysphoric mood  The patient is not nervous/anxious  Family History   Problem Relation Age of Onset   • No Known Problems Mother    • No Known Problems Father        Past Medical History:   Diagnosis Date   • Allergic    • Anxiety    • Bipolar affective disorder [facility])     sees Dr Melina Goldmann   • Depression    • Diabetes mellitus ([facility] )    • Obesity    • Type 2 diabetes mellitus treated without insulin ([facility] ) 11/17/2020       History reviewed  No pertinent surgical history      Social History     Socioeconomic History   • Marital status: Single     Spouse name: None   • Number of children: None   • Years of education: None   • Highest education level: None   Occupational History   • Occupation: unemployed   Tobacco Use   • Smoking status: Former   • Smokeless tobacco: Never   Vaping Use   • Vaping Use: Never used   Substance and Sexual Activity   • Alcohol use: Not Currently   • Drug use: Never     Comment: drug use in past- lcd, marijuana, katamine, cocaine   • Sexual activity: Yes   Other Topics Concern   • None   Social History Narrative    From [place] fluent in both languages     Social Determinants of Health     Financial Resource Strain: Not on file   Food Insecurity: Not on file   Transportation Needs: Not on file   Physical Activity: Not on file   Stress: Not on file   Social Connections: Not on file   Intimate Partner Violence: Not on file   Housing Stability: Not on file       Allergies   Allergen Reactions   • No Known Allergies             Objective:    /90 (BP Location: Left arm, Patient Position: Sitting, Cuff Size: Standard)   Pulse 70   Temp 98 1 °F (36 7 °C) (Tympanic)   Ht 5' 11" (1 803 m)   Wt 107 kg (235 lb 12 8 oz)   SpO2 98%   BMI 32 89 kg/m²        Physical Exam  Constitutional:       General: He is not in acute distress  Appearance: He is well-developed  He is obese  HENT:      Head: Normocephalic and atraumatic  Nose: Nose normal       Mouth/Throat:      Pharynx: No oropharyngeal exudate  Eyes:      General: No scleral icterus  Conjunctiva/sclera: Conjunctivae normal       Pupils: Pupils are equal, round, and reactive to light  Neck:      Thyroid: No thyromegaly  Vascular: No JVD  Trachea: No tracheal deviation  Cardiovascular:      Rate and Rhythm: Normal rate and regular rhythm  Heart sounds: Normal heart sounds  No murmur heard  No friction rub  No gallop  Pulmonary:      Effort: Pulmonary effort is normal  No respiratory distress  Breath sounds: No wheezing or rales  Chest:      Chest wall: No tenderness  Abdominal:      General: Bowel sounds are normal  There is no distension  Palpations: Abdomen is soft  There is no mass  Tenderness: There is no abdominal tenderness  There is no guarding or rebound  Musculoskeletal:         General: No deformity  Normal range of motion  Cervical back: Normal range of motion and neck supple  Lymphadenopathy:      Cervical: No cervical adenopathy  Skin:     General: Skin is warm and dry  Findings: No rash  Neurological:      Mental Status: He is alert and oriented to person, place, and time  Cranial Nerves: No cranial nerve deficit  Coordination: Coordination normal    Psychiatric:         Behavior: Behavior normal          Thought Content:  Thought content normal          Judgment: Judgment normal

## 2023-03-17 ENCOUNTER — APPOINTMENT (OUTPATIENT)
Dept: LAB | Facility: MEDICAL CENTER | Age: 48
End: 2023-03-17

## 2023-03-17 DIAGNOSIS — A09 TRAVELER'S DIARRHEA: ICD-10-CM

## 2023-03-17 DIAGNOSIS — E11.9 TYPE 2 DIABETES MELLITUS TREATED WITHOUT INSULIN (HCC): ICD-10-CM

## 2023-03-17 LAB
CAMPYLOBACTER DNA SPEC NAA+PROBE: NORMAL
SALMONELLA DNA SPEC QL NAA+PROBE: NORMAL
SHIGA TOXIN STX GENE SPEC NAA+PROBE: NORMAL
SHIGELLA DNA SPEC QL NAA+PROBE: NORMAL

## 2023-03-18 LAB — G LAMBLIA AG STL QL IA: NEGATIVE

## 2023-03-24 ENCOUNTER — TELEPHONE (OUTPATIENT)
Dept: GASTROENTEROLOGY | Facility: CLINIC | Age: 48
End: 2023-03-24

## 2023-03-24 LAB — WBC SPEC QL GRAM STN: NORMAL

## 2023-03-24 NOTE — TELEPHONE ENCOUNTER
03/24/23  Screened by: Mehran Hampton    Referring Provider     Pre- Screening: There is no height or weight on file to calculate BMI  Has patient been referred for a routine screening Colonoscopy? yes  Is the patient between 39-70 years old? yes      Previous Colonoscopy no   If yes:    Date:     Facility:     Reason:       SCHEDULING STAFF: If the patient is between 45yrs-49yrs, please advise patient to confirm benefits/coverage with their insurance company for a routine screening colonoscopy, some insurance carriers will only cover at [address] or older  If the patient is over 66years old, please schedule an office visit  Does the patient want to see a Gastroenterologist prior to their procedure OR are they having any GI symptoms? no    Has the patient been hospitalized or had abdominal surgery in the past 6 months? no    Does the patient use supplemental oxygen? no    Does the patient take Coumadin, Lovenox, Plavix, Elliquis, Xarelto, or other blood thinning medication? no    Has the patient had a stroke, cardiac event, or stent placed in the past year? no     Patient passed OA     SCHEDULING STAFF: If patient answers NO to above questions, then schedule procedure  If patient answers YES to above questions, then schedule office appointment  If patient is between 45yrs - 49yrs, please advise patient that we will have to confirm benefits & coverage with their insurance company for a routine screening colonoscopy

## 2023-03-24 NOTE — TELEPHONE ENCOUNTER
Scheduled date of colonoscopy (as of today):  05/11/2023  Physician performing colonoscopy: Dr Becker  Location of colonoscopy: [facility]  Bowel prep reviewed with patient:  Instructions reviewed with patient by:  Clearances:

## 2023-04-03 ENCOUNTER — TELEPHONE (OUTPATIENT)
Dept: FAMILY MEDICINE CLINIC | Facility: CLINIC | Age: 48
End: 2023-04-03

## 2023-04-05 NOTE — TELEPHONE ENCOUNTER
Pharmacy was called I spoke with Jo Carter who told me that their system was down. I will call again tomorrow to get the info I need for the prior auth.

## 2023-05-10 RX ORDER — SODIUM CHLORIDE 9 MG/ML
125 INJECTION, SOLUTION INTRAVENOUS CONTINUOUS
Status: CANCELLED | OUTPATIENT
Start: 2023-05-10

## 2023-05-10 RX ORDER — ONDANSETRON 2 MG/ML
4 INJECTION INTRAMUSCULAR; INTRAVENOUS EVERY 6 HOURS PRN
Status: CANCELLED | OUTPATIENT
Start: 2023-05-10

## 2023-05-11 ENCOUNTER — ANESTHESIA EVENT (OUTPATIENT)
Dept: GASTROENTEROLOGY | Facility: MEDICAL CENTER | Age: 48
End: 2023-05-11

## 2023-05-11 ENCOUNTER — HOSPITAL ENCOUNTER (OUTPATIENT)
Dept: GASTROENTEROLOGY | Facility: MEDICAL CENTER | Age: 48
Setting detail: OUTPATIENT SURGERY
Discharge: HOME | End: 2023-05-11
Attending: INTERNAL MEDICINE | Admitting: INTERNAL MEDICINE

## 2023-05-11 ENCOUNTER — ANESTHESIA (OUTPATIENT)
Dept: GASTROENTEROLOGY | Facility: MEDICAL CENTER | Age: 48
End: 2023-05-11

## 2023-05-11 VITALS
SYSTOLIC BLOOD PRESSURE: 124 MMHG | HEART RATE: 56 BPM | DIASTOLIC BLOOD PRESSURE: 82 MMHG | OXYGEN SATURATION: 95 % | RESPIRATION RATE: 16 BRPM

## 2023-05-11 DIAGNOSIS — Z12.11 SPECIAL SCREENING FOR MALIGNANT NEOPLASMS, COLON: ICD-10-CM

## 2023-05-11 RX ORDER — PROPOFOL 10 MG/ML
INJECTION, EMULSION INTRAVENOUS AS NEEDED
Status: DISCONTINUED | OUTPATIENT
Start: 2023-05-11 | End: 2023-05-11

## 2023-05-11 RX ORDER — PROPOFOL 10 MG/ML
INJECTION, EMULSION INTRAVENOUS CONTINUOUS PRN
Status: DISCONTINUED | OUTPATIENT
Start: 2023-05-11 | End: 2023-05-11

## 2023-05-11 RX ORDER — SODIUM CHLORIDE 9 MG/ML
125 INJECTION, SOLUTION INTRAVENOUS CONTINUOUS
Status: DISCONTINUED | OUTPATIENT
Start: 2023-05-11 | End: 2023-05-15 | Stop reason: HOSPADM

## 2023-05-11 RX ORDER — ONDANSETRON 2 MG/ML
4 INJECTION INTRAMUSCULAR; INTRAVENOUS EVERY 6 HOURS PRN
Status: DISCONTINUED | OUTPATIENT
Start: 2023-05-11 | End: 2023-05-15 | Stop reason: HOSPADM

## 2023-05-11 RX ADMIN — PROPOFOL 150 MCG/KG/MIN: 10 INJECTION, EMULSION INTRAVENOUS at 09:10

## 2023-05-11 RX ADMIN — PROPOFOL 50 MG: 10 INJECTION, EMULSION INTRAVENOUS at 09:10

## 2023-05-11 RX ADMIN — PROPOFOL 50 MG: 10 INJECTION, EMULSION INTRAVENOUS at 09:08

## 2023-05-11 RX ADMIN — SODIUM CHLORIDE 125 ML/HR: 0.9 INJECTION, SOLUTION INTRAVENOUS at 08:41

## 2023-05-11 NOTE — ANESTHESIA POSTPROCEDURE EVALUATION
Post-Op Assessment Note    CV Status:  Stable    Pain management: adequate     Mental Status:  Alert and awake   Hydration Status:  Euvolemic   PONV Controlled:  Controlled   Airway Patency:  Patent   Two or more mitigation strategies used for obstructive sleep apnea   Post Op Vitals Reviewed: Yes      Staff: Anesthesiologist         No notable events documented      BP      Temp      Pulse     Resp      SpO2      /82   Pulse 56   Resp 16   SpO2 95%

## 2023-05-11 NOTE — H&P
History and Physical - [facility]  Preeti Hamm 53 y o  male MRN: [id number]                   HPI: Preeti Hamm is a 53y o  year old male who presents for colon cancer screening      REVIEW OF SYSTEMS: Per the HPI, and otherwise unremarkable  Historical Information   Past Medical History:   Diagnosis Date   • Allergic    • Anxiety    • Bipolar affective disorder [facility])     sees Dr Malia Morris   • Depression    • Diabetes mellitus ([facility] )    • Obesity    • Type 2 diabetes mellitus treated without insulin ([facility] ) 2020     History reviewed  No pertinent surgical history    Social History   Social History     Substance and Sexual Activity   Alcohol Use Not Currently     Social History     Substance and Sexual Activity   Drug Use Not Currently    Comment: drug use in past- lcd, marijuana, katamine, cocaine     Social History     Tobacco Use   Smoking Status Former   • Packs/day: 0 25   • Types: Cigarettes   • Start date:    • Quit date:    • Years since quittin 3   Smokeless Tobacco Never     Family History   Problem Relation Age of Onset   • No Known Problems Mother    • No Known Problems Father        Meds/Allergies       Current Outpatient Medications:   •  ARIPiprazole (ABILIFY) 5 mg tablet  •  Biotin 1000 MCG CHEW  •  cetirizine (ZyrTEC) 5 MG tablet  •  cholecalciferol (VITAMIN D3) 1,000 units tablet  •  escitalopram (LEXAPRO) 20 mg tablet  •  fluticasone (FLONASE) 50 mcg/act nasal spray  •  Multiple Vitamins-Minerals (CENTRUM SILVER PO)  •  omega-3-acid ethyl esters (LOVAZA) 1 g capsule  •  rosuvastatin (CRESTOR) 10 MG tablet  •  zolpidem (AMBIEN) 10 mg tablet  •  Blood Glucose Monitoring Suppl (ONE TOUCH ULTRA 2) w/Device KIT  •  glucose blood test strip  •  loratadine (CLARITIN) 10 mg tablet  •  polyethylene glycol (GOLYTELY) 4000 mL solution  •  semaglutide (Rybelsus) 14 MG tablet    Current Facility-Administered Medications:   •  sodium chloride 0 9 % infusion, 125 mL/hr, Intravenous, Continuous, 125 mL/hr at 05/11/23 0841    Allergies   Allergen Reactions   • No Known Allergies        Objective     There were no vitals taken for this visit  PHYSICAL EXAM    Gen: NAD  Head: NCAT  CV: RRR  CHEST: Clear  ABD: soft, NT/ND  EXT: no edema      ASSESSMENT/PLAN:  This is a 53y o  year old male here for colonoscopy, and he is stable and optimized for his procedure

## 2023-05-11 NOTE — ANESTHESIA PREPROCEDURE EVALUATION
Procedure:  COLONOSCOPY    Relevant Problems   ANESTHESIA (within normal limits)      CARDIO   (+) Mixed hyperlipidemia      ENDO   (+) Type 2 diabetes mellitus treated without insulin (HCC)      HEMATOLOGY   (+) Thalassemia trait      NEURO/PSYCH  past drug use   (+) Bipolar affective disorder, currently depressed, mild (HCC)   (+) Depression      PULMONARY (within normal limits)      Other   (+) Metabolic syndrome   (+) Severe obesity (BMI 35 0-39  9) with comorbidity [facility])        Physical Exam    Airway    Mallampati score: II  TM Distance: >3 FB  Neck ROM: full     Dental   No notable dental hx     Cardiovascular  Rhythm: regular, Rate: normal, Cardiovascular exam normal    Pulmonary  Pulmonary exam normal Breath sounds clear to auscultation,     Other Findings        Anesthesia Plan  ASA Score- 2     Anesthesia Type- IV sedation with anesthesia with ASA Monitors  Additional Monitors:   Airway Plan:           Plan Factors-Exercise tolerance (METS): >4 METS  Chart reviewed  Existing labs reviewed  Patient summary reviewed  Patient is not a current smoker  Induction- intravenous  Postoperative Plan-     Informed Consent- Anesthetic plan and risks discussed with patient

## 2023-05-18 NOTE — RESULT ENCOUNTER NOTE
I called him with his results  Since the descending colon polyp was not an adenoma (precancerous) he does not need to repeat colonoscopy in 3 years    Based on his family history of colon polyps, he should repeat colonoscopy in 5 years

## 2023-05-30 ENCOUNTER — OFFICE VISIT (OUTPATIENT)
Dept: FAMILY MEDICINE CLINIC | Facility: CLINIC | Age: 48
End: 2023-05-30

## 2023-05-30 VITALS
WEIGHT: 241 LBS | HEIGHT: 71 IN | DIASTOLIC BLOOD PRESSURE: 76 MMHG | OXYGEN SATURATION: 98 % | HEART RATE: 71 BPM | TEMPERATURE: 97.1 F | SYSTOLIC BLOOD PRESSURE: 134 MMHG | BODY MASS INDEX: 33.74 KG/M2 | RESPIRATION RATE: 16 BRPM

## 2023-05-30 DIAGNOSIS — D56.3 THALASSEMIA TRAIT: ICD-10-CM

## 2023-05-30 DIAGNOSIS — E88.81 METABOLIC SYNDROME: ICD-10-CM

## 2023-05-30 DIAGNOSIS — Z12.5 PROSTATE CANCER SCREENING: ICD-10-CM

## 2023-05-30 DIAGNOSIS — E78.2 MIXED HYPERLIPIDEMIA: ICD-10-CM

## 2023-05-30 DIAGNOSIS — E11.9 TYPE 2 DIABETES MELLITUS TREATED WITHOUT INSULIN (HCC): Primary | ICD-10-CM

## 2023-05-30 DIAGNOSIS — F32.89 OTHER DEPRESSION: ICD-10-CM

## 2023-05-30 LAB — SL AMB POCT HEMOGLOBIN AIC: 6.2 (ref ?–6.5)

## 2023-05-30 NOTE — PROGRESS NOTES
Assessment/Plan: This 50-year-old male has been on Rybelsus 14 mg for about 6 weeks and has absolutely no side effects  He has lost 10 pounds since the start of Rybelsus  He lost maximum 20 pounds over the the winter but has gained back 10 and has stabilized at this weight  He is very happy with his current status  His hemoglobin A1c is 6 1 and he started out at 8 0 and is much better controlled with his diabetes type 2  He will have follow-up blood testing before his next visit with his new internist this summer  Which a cholesterol panel PSA chemistries and CBC  Screening colonoscopy was done recently and 1 benign polyp was found  He will return in 5 years  Diet reviewed  Lifestyle modifications reviewed  Medications reviewed and ordered  Laboratory tests and studies reviewed and ordered  All patient's questions answered to patient satisfaction  Chief Complaint   Patient presents with   • Follow-up     Discuss colonoscopy results  Pt would like some information on wegovy and other medication for diabetes  Diagnoses and all orders for this visit:    Type 2 diabetes mellitus treated without insulin (HCC)  -     POCT hemoglobin A1c  -     CBC and differential; Future  -     Comprehensive metabolic panel; Future  -     Hemoglobin A1C; Future  -     LDL cholesterol, direct; Future  -     TSH, 3rd generation with Free T4 reflex; Future  -     Albumin / creatinine urine ratio; Future  -     Lipid panel; Future  -     PSA, Total Screen; Future    Prostate cancer screening  -     PSA, Total Screen; Future    Other depression  -     TSH, 3rd generation with Free T4 reflex; Future    Mixed hyperlipidemia  -     LDL cholesterol, direct; Future  -     Lipid panel; Future    Metabolic syndrome    Thalassemia trait        Subjective: This 50-year-old male has been on Rybelsus 14 mg for about 6 weeks and has absolutely no side effects  He has lost 10 pounds since the start of Rybelsus    He lost maximum 20 pounds over the the winter but has gained back 10 and has stabilized at this weight  He is very happy with his current status  His hemoglobin A1c is 6 1 and he started out at 8 0 and is much better controlled with his diabetes type 2  He will have follow-up blood testing before his next visit with his new internist this summer  Which a cholesterol panel PSA chemistries and CBC  Screening colonoscopy was done recently and 1 benign polyp was found  He will return in 5 years  Patient ID: Patti Hawk is a 53 y o  male  Current Outpatient Medications:   •  ARIPiprazole (ABILIFY) 5 mg tablet, Take 1 tablet by mouth daily , Disp: , Rfl:   •  Biotin 1000 MCG CHEW, Chew in the morning, Disp: , Rfl:   •  Blood Glucose Monitoring Suppl (ONE TOUCH ULTRA 2) w/Device KIT, by Does not apply route daily Test twice daily  , Disp: 1 each, Rfl: 0  •  cetirizine (ZyrTEC) 5 MG tablet, Take 5 mg by mouth daily  , Disp: , Rfl:   •  cholecalciferol (VITAMIN D3) 1,000 units tablet, Take 1,000 Units by mouth every other day, Disp: , Rfl:   •  escitalopram (LEXAPRO) 20 mg tablet, Take 20 mg by mouth daily, Disp: , Rfl:   •  fluticasone (FLONASE) 50 mcg/act nasal spray, 2 sprays into each nostril as needed , Disp: , Rfl:   •  glucose blood test strip, Test twice daily Dx: Diabetes E11 9  one touch ultra 2 test strips, Disp: 200 each, Rfl: 1  •  Multiple Vitamins-Minerals (CENTRUM SILVER PO), Take by mouth, Disp: , Rfl:   •  omega-3-acid ethyl esters (LOVAZA) 1 g capsule, Take 1 capsule (1 g total) by mouth 2 (two) times a day, Disp: 60 capsule, Rfl: 5  •  rosuvastatin (CRESTOR) 10 MG tablet, TAKE 1 TABLET BY MOUTH EVERY DAY, Disp: 90 tablet, Rfl: 3  •  semaglutide (Rybelsus) 14 MG tablet, Take 1 tablet (14 mg total) by mouth in the morning, Disp: 30 tablet, Rfl: 11  •  zolpidem (AMBIEN) 10 mg tablet, Take 10 mg by mouth daily at bedtime , Disp: , Rfl:   •  loratadine (CLARITIN) 10 mg tablet, Take 1 tablet by mouth daily (Patient not taking: Reported on 9/2/2021), Disp: , Rfl:   •  polyethylene glycol (GOLYTELY) 4000 mL solution, Take 4,000 mL by mouth once for 1 dose Take as directed by office, Disp: 4000 mL, Rfl: 0    The following portions of the patient's history were reviewed and updated as appropriate: allergies, current medications, past family history, past medical history, past social history, past surgical history and problem list     Review of Systems   Constitutional: Negative for appetite change, fatigue, fever and unexpected weight change  HENT: Negative for rhinorrhea, sinus pressure, sinus pain, sneezing and sore throat  Eyes: Negative for visual disturbance  Respiratory: Negative for cough, chest tightness, shortness of breath and wheezing  Cardiovascular: Negative for chest pain, palpitations and leg swelling  Gastrointestinal: Negative for abdominal distention, abdominal pain, blood in stool, constipation, diarrhea, nausea and vomiting  Endocrine: Negative for polydipsia and polyuria  Genitourinary: Negative for decreased urine volume, difficulty urinating, dysuria, hematuria and urgency  Musculoskeletal: Negative for arthralgias, back pain, joint swelling and neck pain  Skin: Negative for rash  Allergic/Immunologic: Negative for environmental allergies  Neurological: Negative for tremors, weakness, light-headedness, numbness and headaches  Hematological: Does not bruise/bleed easily  Psychiatric/Behavioral: Negative for agitation, behavioral problems, confusion and dysphoric mood  The patient is not nervous/anxious            Family History   Problem Relation Age of Onset   • No Known Problems Mother    • No Known Problems Father        Past Medical History:   Diagnosis Date   • Allergic    • Anxiety    • Bipolar affective disorder [facility])     sees Dr Sylwia Gomez   • Depression    • Diabetes mellitus ([place] [address] )    • Obesity    • Type 2 diabetes mellitus treated without insulin ([place] [address] ) 11/17/2020 "      History reviewed  No pertinent surgical history  Social History     Socioeconomic History   • Marital status: Single     Spouse name: None   • Number of children: None   • Years of education: None   • Highest education level: None   Occupational History   • Occupation: unemployed   Tobacco Use   • Smoking status: Former     Packs/day: 0 25     Types: Cigarettes     Start date:      Quit date:      Years since quittin 4   • Smokeless tobacco: Never   Vaping Use   • Vaping Use: Never used   Substance and Sexual Activity   • Alcohol use: Not Currently   • Drug use: Not Currently     Comment: drug use in past- lcd, marijuana, katamine, cocaine   • Sexual activity: Yes   Other Topics Concern   • None   Social History Narrative    From [place] fluent in both languages     Social Determinants of Health     Financial Resource Strain: Not on file   Food Insecurity: Not on file   Transportation Needs: Not on file   Physical Activity: Not on file   Stress: Not on file   Social Connections: Not on file   Intimate Partner Violence: Not on file   Housing Stability: Not on file       Allergies   Allergen Reactions   • No Known Allergies             Objective:    /76 (BP Location: Left arm, Patient Position: Sitting, Cuff Size: Large)   Pulse 71   Temp (!) 97 1 °F (36 2 °C) (Tympanic)   Resp 16   Ht 5' 11\" (1 803 m)   Wt 109 kg (241 lb)   SpO2 98%   BMI 33 61 kg/m²        Physical Exam  Constitutional:       General: He is not in acute distress  Appearance: He is well-developed  HENT:      Head: Normocephalic and atraumatic  Nose: Nose normal       Mouth/Throat:      Pharynx: No oropharyngeal exudate  Eyes:      General: No scleral icterus  Conjunctiva/sclera: Conjunctivae normal       Pupils: Pupils are equal, round, and reactive to light  Neck:      Thyroid: No thyromegaly  Vascular: No JVD  Trachea: No tracheal deviation     Cardiovascular:      Rate and Rhythm: " Normal rate and regular rhythm  Heart sounds: Normal heart sounds  No murmur heard  No friction rub  No gallop  Pulmonary:      Effort: Pulmonary effort is normal  No respiratory distress  Breath sounds: No wheezing or rales  Chest:      Chest wall: No tenderness  Abdominal:      General: Bowel sounds are normal  There is no distension  Palpations: Abdomen is soft  There is no mass  Tenderness: There is no abdominal tenderness  There is no guarding or rebound  Musculoskeletal:         General: No deformity  Normal range of motion  Cervical back: Normal range of motion and neck supple  Lymphadenopathy:      Cervical: No cervical adenopathy  Skin:     General: Skin is warm and dry  Findings: No rash  Neurological:      Mental Status: He is alert and oriented to person, place, and time  Cranial Nerves: No cranial nerve deficit  Coordination: Coordination normal    Psychiatric:         Behavior: Behavior normal          Thought Content:  Thought content normal          Judgment: Judgment normal

## 2023-07-06 ENCOUNTER — OFFICE VISIT (OUTPATIENT)
Dept: INTERNAL MEDICINE CLINIC | Facility: OTHER | Age: 48
End: 2023-07-06
Payer: COMMERCIAL

## 2023-07-06 VITALS
WEIGHT: 241.4 LBS | OXYGEN SATURATION: 96 % | DIASTOLIC BLOOD PRESSURE: 84 MMHG | HEIGHT: 71 IN | RESPIRATION RATE: 18 BRPM | BODY MASS INDEX: 33.8 KG/M2 | TEMPERATURE: 97.7 F | HEART RATE: 63 BPM | SYSTOLIC BLOOD PRESSURE: 126 MMHG

## 2023-07-06 DIAGNOSIS — F32.89 OTHER DEPRESSION: ICD-10-CM

## 2023-07-06 DIAGNOSIS — E88.81 METABOLIC SYNDROME: ICD-10-CM

## 2023-07-06 DIAGNOSIS — E78.2 MIXED HYPERLIPIDEMIA: ICD-10-CM

## 2023-07-06 DIAGNOSIS — E66.01 SEVERE OBESITY (BMI 35.0-39.9) WITH COMORBIDITY (HCC): ICD-10-CM

## 2023-07-06 DIAGNOSIS — Z12.5 PROSTATE CANCER SCREENING: ICD-10-CM

## 2023-07-06 DIAGNOSIS — E11.9 TYPE 2 DIABETES MELLITUS TREATED WITHOUT INSULIN (HCC): Primary | ICD-10-CM

## 2023-07-06 DIAGNOSIS — F31.31 BIPOLAR AFFECTIVE DISORDER, CURRENTLY DEPRESSED, MILD (HCC): ICD-10-CM

## 2023-07-06 PROBLEM — E73.9 LACTOSE INTOLERANCE: Status: RESOLVED | Noted: 2022-08-04 | Resolved: 2023-07-06

## 2023-07-06 PROBLEM — Z71.89 ENCOUNTER FOR CARDIAC RISK COUNSELING: Status: RESOLVED | Noted: 2022-08-04 | Resolved: 2023-07-06

## 2023-07-06 PROCEDURE — 99215 OFFICE O/P EST HI 40 MIN: CPT | Performed by: INTERNAL MEDICINE

## 2023-07-06 NOTE — PROGRESS NOTES
Assessment/Plan:    Type 2 diabetes mellitus treated without insulin (HCC)   Well-controlled. Continue Rybelsus. Lab Results   Component Value Date    HGBA1C 6.2 05/30/2023       Depression  Stable, controlled. Continue follow-up with psychiatry. Mixed hyperlipidemia  Continue omega-3 and rosuvastatin 10 mg daily. Severe obesity (BMI 35.0-39. 9) with comorbidity ([address])  Discussed diet and exercise. Continue Rybelsus. Diagnoses and all orders for this visit:    Type 2 diabetes mellitus treated without insulin ([address])  -     CBC; Future  -     Comprehensive metabolic panel; Future  -     Hemoglobin A1C; Future    Prostate cancer screening  -     PSA, Total Screen; Future    Mixed hyperlipidemia  -     CBC; Future  -     Comprehensive metabolic panel; Future    Metabolic syndrome  -     CBC; Future  -     Comprehensive metabolic panel; Future  -     Hemoglobin A1C; Future  -     TSH, 3rd generation with Free T4 reflex; Future    Bipolar affective disorder, currently depressed, mild (HCC)    Severe obesity (BMI 35.0-39. 9) with comorbidity ([address])    Other depression                  Subjective:      Patient ID: Anil Ozuna is a 53 y.o. male. Chief Complaint   Patient presents with   • Establish Care     Has active labs from 5/30/23 previous PCP has retired. No issues    • hm     Eye exam due 9/7/23 ( sees Dr Guillermina Dorsey ), annual after 8/4/23, A1C due 12/1/23, urine micro       53year old male is seen today to [facility]. He has a PMhx of diabetes mellitus, hyperlipidemia, bipolar disorder, and depression. He exercises regularly, cardiovascular 30-40 minutes daily. He has lost approximately 18 lbs since starting Rybelsus. His A1c is well controlled. He has no active complaints or concerns ay this time. He follows up with his psychiatrist regularly. Diabetes  He presents for his follow-up diabetic visit. He has type 2 diabetes mellitus. His disease course has been stable.  Pertinent negatives for hypoglycemia include no dizziness or headaches. There are no diabetic associated symptoms. Pertinent negatives for diabetes include no chest pain, no fatigue and no weakness. There are no hypoglycemic complications. Symptoms are stable. There are no diabetic complications. Risk factors for coronary artery disease include diabetes mellitus. Current diabetic treatment includes diet and oral agent (monotherapy). He is compliant with treatment all of the time. He is following a generally healthy diet. He participates in exercise daily. There is no change in his home blood glucose trend. Anxiety  Presents for follow-up visit. Patient reports no chest pain, dizziness, nausea, palpitations, shortness of breath or suicidal ideas. Symptoms occur rarely. The severity of symptoms is mild. The patient sleeps 8 hours per night. The quality of sleep is good. Nighttime awakenings: none. Compliance with medications is %. Hyperlipidemia  This is a chronic problem. The current episode started more than 1 year ago. The problem is controlled. Recent lipid tests were reviewed and are normal. Pertinent negatives include no chest pain or shortness of breath. Current antihyperlipidemic treatment includes statins. The current treatment provides moderate improvement of lipids. The following portions of the patient's history were reviewed and updated as appropriate: allergies, current medications, past family history, past medical history, past social history, past surgical history and problem list.    Review of Systems   Constitutional: Negative for activity change, appetite change, chills, diaphoresis, fatigue and fever. HENT: Negative for congestion, postnasal drip, rhinorrhea, sinus pressure, sinus pain, sneezing and sore throat. Eyes: Negative for visual disturbance. Respiratory: Negative for apnea, cough, choking, chest tightness, shortness of breath and wheezing.     Cardiovascular: Negative for chest pain, palpitations and leg swelling. Gastrointestinal: Negative for abdominal distention, abdominal pain, anal bleeding, blood in stool, constipation, diarrhea, nausea and vomiting. Endocrine: Negative for cold intolerance and heat intolerance. Genitourinary: Negative for difficulty urinating, dysuria and hematuria. Musculoskeletal: Negative. Skin: Negative. Neurological: Negative for dizziness, weakness, light-headedness, numbness and headaches. Hematological: Negative for adenopathy. Psychiatric/Behavioral: Negative for agitation, sleep disturbance and suicidal ideas. All other systems reviewed and are negative.         Past Medical History:   Diagnosis Date   • Allergic    • Anxiety    • Bipolar affective disorder [facility])     sees Dr. Tata Villarreal   • Depression    • Diabetes mellitus ([address] [facility])    • Lactose intolerance 8/4/2022   • Obesity    • Type 2 diabetes mellitus treated without insulin ([address] [facility]) 11/17/2020         Current Outpatient Medications:   •  ARIPiprazole (ABILIFY) 5 mg tablet, Take 1 tablet by mouth daily , Disp: , Rfl:   •  Biotin 1000 MCG CHEW, Chew in the morning dissolvable, Disp: , Rfl:   •  cetirizine (ZyrTEC) 5 MG tablet, Take 5 mg by mouth daily  , Disp: , Rfl:   •  cholecalciferol (VITAMIN D3) 1,000 units tablet, Take 1,000 Units by mouth every other day, Disp: , Rfl:   •  escitalopram (LEXAPRO) 20 mg tablet, Take 20 mg by mouth daily, Disp: , Rfl:   •  fluticasone (FLONASE) 50 mcg/act nasal spray, 2 sprays into each nostril as needed , Disp: , Rfl:   •  glucose blood test strip, Test twice daily Dx: Diabetes E11.9  one touch ultra 2 test strips, Disp: 200 each, Rfl: 1  •  Multiple Vitamins-Minerals (CENTRUM SILVER PO), Take by mouth, Disp: , Rfl:   •  omega-3-acid ethyl esters (LOVAZA) 1 g capsule, Take 1 capsule (1 g total) by mouth 2 (two) times a day, Disp: 60 capsule, Rfl: 5  •  rosuvastatin (CRESTOR) 10 MG tablet, TAKE 1 TABLET BY MOUTH EVERY DAY, Disp: 90 tablet, Rfl: 3  •  semaglutide (Rybelsus) 14 MG tablet, Take 1 tablet (14 mg total) by mouth in the morning, Disp: 30 tablet, Rfl: 11  •  zolpidem (AMBIEN) 10 mg tablet, Take 10 mg by mouth daily at bedtime , Disp: , Rfl:     Allergies   Allergen Reactions   • No Known Allergies        Social History   History reviewed. No pertinent surgical history. Family History   Problem Relation Age of Onset   • No Known Problems Mother    • No Known Problems Father        Objective:  /84 (BP Location: Left arm, Patient Position: Sitting, Cuff Size: Large)   Pulse 63   Temp 97.7 °F (36.5 °C) (Temporal)   Resp 18   Ht 5' 11" (1.803 m)   Wt 109 kg (241 lb 6.4 oz)   SpO2 96%   BMI 33.67 kg/m²     Recent Results (from the past 1344 hour(s))   POCT hemoglobin A1c    Collection Time: 05/30/23 10:44 AM   Result Value Ref Range    Hemoglobin A1C 6.2 6.5            Physical Exam  Vitals and nursing note reviewed. Constitutional:       General: He is not in acute distress. Appearance: He is well-developed. He is not diaphoretic. HENT:      Head: Normocephalic and atraumatic. Eyes:      General: No scleral icterus. Right eye: No discharge. Left eye: No discharge. Conjunctiva/sclera: Conjunctivae normal.      Pupils: Pupils are equal, round, and reactive to light. Neck:      Thyroid: No thyromegaly. Vascular: No JVD. Cardiovascular:      Rate and Rhythm: Normal rate and regular rhythm. Heart sounds: Normal heart sounds. No murmur heard. No friction rub. No gallop. Pulmonary:      Effort: Pulmonary effort is normal. No respiratory distress. Breath sounds: Normal breath sounds. No wheezing or rales. Chest:      Chest wall: No tenderness. Abdominal:      General: Bowel sounds are normal. There is no distension. Palpations: Abdomen is soft. There is no mass. Tenderness: There is no abdominal tenderness. There is no guarding or rebound.    Musculoskeletal:         General: No tenderness or deformity. Normal range of motion. Cervical back: Normal range of motion and neck supple. Lymphadenopathy:      Cervical: No cervical adenopathy. Skin:     General: Skin is warm and dry. Coloration: Skin is not pale. Findings: No erythema or rash. Neurological:      Mental Status: He is alert and oriented to person, place, and time. Cranial Nerves: No cranial nerve deficit. Coordination: Coordination normal.      Deep Tendon Reflexes: Reflexes are normal and symmetric. Psychiatric:         Behavior: Behavior normal.         Thought Content:  Thought content normal.         Judgment: Judgment normal.

## 2023-07-07 ENCOUNTER — VBI (OUTPATIENT)
Dept: ADMINISTRATIVE | Facility: OTHER | Age: 48
End: 2023-07-07

## 2023-09-14 DIAGNOSIS — E11.9 TYPE 2 DIABETES MELLITUS TREATED WITHOUT INSULIN (HCC): Primary | ICD-10-CM

## 2023-09-14 NOTE — PROGRESS NOTES
Patient needs new referral for eye doctor for DM eye exam-       appt is scheduled for 9/26/23 jane Bonilla

## 2023-09-26 LAB
LEFT EYE DIABETIC RETINOPATHY: NORMAL
RIGHT EYE DIABETIC RETINOPATHY: NORMAL

## 2023-10-11 LAB
LEFT EYE DIABETIC RETINOPATHY: NORMAL
RIGHT EYE DIABETIC RETINOPATHY: NORMAL

## 2023-11-20 ENCOUNTER — OFFICE VISIT (OUTPATIENT)
Dept: INTERNAL MEDICINE CLINIC | Facility: OTHER | Age: 48
End: 2023-11-20
Payer: COMMERCIAL

## 2023-11-20 VITALS
HEART RATE: 60 BPM | TEMPERATURE: 98.4 F | BODY MASS INDEX: 34.72 KG/M2 | RESPIRATION RATE: 18 BRPM | SYSTOLIC BLOOD PRESSURE: 132 MMHG | OXYGEN SATURATION: 96 % | HEIGHT: 71 IN | DIASTOLIC BLOOD PRESSURE: 86 MMHG | WEIGHT: 248 LBS

## 2023-11-20 DIAGNOSIS — E11.9 TYPE 2 DIABETES MELLITUS TREATED WITHOUT INSULIN (HCC): Primary | ICD-10-CM

## 2023-11-20 DIAGNOSIS — I10 PRIMARY HYPERTENSION: ICD-10-CM

## 2023-11-20 PROCEDURE — 99213 OFFICE O/P EST LOW 20 MIN: CPT | Performed by: INTERNAL MEDICINE

## 2023-11-20 RX ORDER — LISINOPRIL 5 MG/1
5 TABLET ORAL DAILY
Qty: 90 TABLET | Refills: 3 | Status: SHIPPED | OUTPATIENT
Start: 2023-11-20

## 2023-11-20 NOTE — ASSESSMENT & PLAN NOTE
Given family history of hypertension and diagnosis of diabetes, goal blood pressure of less than 130/80… We will start lisinopril 5 mg daily. Discussed potential adverse effects. He will monitor his blood pressure 3 times a week. ambulatory

## 2023-11-20 NOTE — PROGRESS NOTES
Assessment/Plan:    Primary hypertension  Given family history of hypertension and diagnosis of diabetes, goal blood pressure of less than 130/80… We will start lisinopril 5 mg daily. Discussed potential adverse effects. He will monitor his blood pressure 3 times a week. Diagnoses and all orders for this visit:    Type 2 diabetes mellitus treated without insulin (HCC)  -     lisinopril (ZESTRIL) 5 mg tablet; Take 1 tablet (5 mg total) by mouth daily    Primary hypertension  -     lisinopril (ZESTRIL) 5 mg tablet; Take 1 tablet (5 mg total) by mouth daily          BMI Counseling: Body mass index is 34.59 kg/m². The BMI is above normal. Nutrition recommendations include decreasing portion sizes, encouraging healthy choices of fruits and vegetables, decreasing fast food intake, consuming healthier snacks, limiting drinks that contain sugar, moderation in carbohydrate intake, increasing intake of lean protein, reducing intake of saturated and trans fat and reducing intake of cholesterol. Exercise recommendations include moderate physical activity 150 minutes/week and exercising 3-5 times per week. No pharmacotherapy was ordered. Patient referred to PCP. Rationale for BMI follow-up plan is due to patient being overweight or obese. Subjective:      Patient ID: Louisa Cline is a 53 y.o. male. Chief Complaint   Patient presents with   • Hypertension     Elevated BP noticed a trend of them 130-140 to 90 range. Has family history oh hypertension. Did have a log but couldn't find it today. Does not have symptoms        53year old male is seen today with concern for HTN. He has an extensive FH of HTN. He has been monitoring his BP at home to which his BP ranges from 130-140 over 80-90. He denies any symptoms of elevated BP. He has never been on antihypertensives. Hypertension  The problem is unchanged. The problem is controlled.  Pertinent negatives include no chest pain, headaches, palpitations or shortness of breath. The following portions of the patient's history were reviewed and updated as appropriate: allergies, current medications, past family history, past medical history, past social history, past surgical history, and problem list.    Review of Systems   Constitutional:  Negative for activity change, appetite change, chills, diaphoresis, fatigue and fever. HENT:  Negative for congestion, postnasal drip, rhinorrhea, sinus pressure, sinus pain, sneezing and sore throat. Eyes:  Negative for visual disturbance. Respiratory:  Negative for apnea, cough, choking, chest tightness, shortness of breath and wheezing. Cardiovascular:  Negative for chest pain, palpitations and leg swelling. Gastrointestinal:  Negative for abdominal distention, abdominal pain, anal bleeding, blood in stool, constipation, diarrhea, nausea and vomiting. Endocrine: Negative for cold intolerance and heat intolerance. Genitourinary:  Negative for difficulty urinating, dysuria and hematuria. Musculoskeletal: Negative. Skin: Negative. Neurological:  Negative for dizziness, weakness, light-headedness, numbness and headaches. Hematological:  Negative for adenopathy. Psychiatric/Behavioral:  Negative for agitation, sleep disturbance and suicidal ideas. All other systems reviewed and are negative.         Past Medical History:   Diagnosis Date   • Allergic    • Anxiety    • Bipolar affective disorder [facility])     sees Dr. Endy Rice   • Depression    • Diabetes mellitus ([facility])    • Lactose intolerance 8/4/2022   • Obesity    • Primary hypertension 11/20/2023   • Type 2 diabetes mellitus treated without insulin ([facility]) 11/17/2020         Current Outpatient Medications:   •  ARIPiprazole (ABILIFY) 5 mg tablet, Take 1 tablet by mouth daily , Disp: , Rfl:   •  Biotin 1000 MCG CHEW, Chew in the morning dissolvable, Disp: , Rfl:   •  cetirizine (ZyrTEC) 5 MG tablet, Take 5 mg by mouth daily  , Disp: , Rfl:   • cholecalciferol (VITAMIN D3) 1,000 units tablet, Take 1,000 Units by mouth every other day, Disp: , Rfl:   •  escitalopram (LEXAPRO) 20 mg tablet, Take 20 mg by mouth daily, Disp: , Rfl:   •  fluticasone (FLONASE) 50 mcg/act nasal spray, 2 sprays into each nostril as needed , Disp: , Rfl:   •  glucose blood test strip, Test twice daily Dx: Diabetes E11.9  one touch ultra 2 test strips, Disp: 200 each, Rfl: 1  •  lisinopril (ZESTRIL) 5 mg tablet, Take 1 tablet (5 mg total) by mouth daily, Disp: 90 tablet, Rfl: 3  •  Multiple Vitamins-Minerals (CENTRUM SILVER PO), Take by mouth, Disp: , Rfl:   •  omega-3-acid ethyl esters (LOVAZA) 1 g capsule, Take 1 capsule (1 g total) by mouth 2 (two) times a day, Disp: 60 capsule, Rfl: 5  •  rosuvastatin (CRESTOR) 10 MG tablet, TAKE 1 TABLET BY MOUTH EVERY DAY, Disp: 90 tablet, Rfl: 3  •  semaglutide (Rybelsus) 14 MG tablet, Take 1 tablet (14 mg total) by mouth in the morning, Disp: 30 tablet, Rfl: 11  •  zolpidem (AMBIEN) 10 mg tablet, Take 10 mg by mouth daily at bedtime , Disp: , Rfl:     Allergies   Allergen Reactions   • Seasonal Ic [Octacosanol] Nasal Congestion       Social History   History reviewed. No pertinent surgical history.   Family History   Problem Relation Age of Onset   • Hyperlipidemia Mother    • Hypertension Father    • Breast cancer Paternal Grandmother    • Hypertension Paternal Grandmother        Objective:  /86 (BP Location: Left arm, Patient Position: Sitting, Cuff Size: Large)   Pulse 60   Temp 98.4 °F (36.9 °C) (Temporal)   Resp 18   Ht 5' 11" (1.803 m)   Wt 112 kg (248 lb)   SpO2 96%   BMI 34.59 kg/m²     Recent Results (from the past 1344 hour(s))   Hm Diabetes Eye Exam    Collection Time: 09/26/23  8:35 AM   Result Value Ref Range    Right Eye Diabetic Retinopathy None     Left Eye Diabetic Retinopathy None    POCT diabetic eye exam    Collection Time: 10/11/23  8:35 AM   Result Value Ref Range    Right Eye Diabetic Retinopathy None Left Eye Diabetic Retinopathy None             Physical Exam  Vitals and nursing note reviewed. Constitutional:       General: He is not in acute distress. Appearance: He is well-developed. He is not diaphoretic. HENT:      Head: Normocephalic and atraumatic. Eyes:      General: No scleral icterus. Right eye: No discharge. Left eye: No discharge. Conjunctiva/sclera: Conjunctivae normal.      Pupils: Pupils are equal, round, and reactive to light. Neck:      Thyroid: No thyromegaly. Vascular: No JVD. Cardiovascular:      Rate and Rhythm: Normal rate and regular rhythm. Heart sounds: Normal heart sounds. No murmur heard. No friction rub. No gallop. Pulmonary:      Effort: Pulmonary effort is normal. No respiratory distress. Breath sounds: Normal breath sounds. No wheezing or rales. Chest:      Chest wall: No tenderness. Abdominal:      General: Bowel sounds are normal. There is no distension. Palpations: Abdomen is soft. There is no mass. Tenderness: There is no abdominal tenderness. There is no guarding or rebound. Musculoskeletal:         General: No tenderness or deformity. Normal range of motion. Cervical back: Normal range of motion and neck supple. Lymphadenopathy:      Cervical: No cervical adenopathy. Skin:     General: Skin is warm and dry. Coloration: Skin is not pale. Findings: No erythema or rash. Neurological:      Mental Status: He is alert and oriented to person, place, and time. Cranial Nerves: No cranial nerve deficit. Coordination: Coordination normal.      Deep Tendon Reflexes: Reflexes are normal and symmetric. Psychiatric:         Behavior: Behavior normal.         Thought Content:  Thought content normal.         Judgment: Judgment normal.

## 2023-12-06 DIAGNOSIS — E66.01 SEVERE OBESITY (BMI 35.0-39.9) WITH COMORBIDITY (HCC): ICD-10-CM

## 2023-12-06 DIAGNOSIS — F31.31 BIPOLAR AFFECTIVE DISORDER, CURRENTLY DEPRESSED, MILD (HCC): ICD-10-CM

## 2023-12-06 DIAGNOSIS — E88.810 METABOLIC SYNDROME: ICD-10-CM

## 2023-12-06 DIAGNOSIS — E11.9 TYPE 2 DIABETES MELLITUS TREATED WITHOUT INSULIN (HCC): ICD-10-CM

## 2023-12-06 DIAGNOSIS — E78.2 MIXED HYPERLIPIDEMIA: ICD-10-CM

## 2023-12-07 ENCOUNTER — TELEPHONE (OUTPATIENT)
Dept: INTERNAL MEDICINE CLINIC | Age: 48
End: 2023-12-07

## 2023-12-07 NOTE — TELEPHONE ENCOUNTER
Patient called stating that the pharmacy needs prior authorization.  [facility]/pharmacy #[id number]- [place] - [address]

## 2023-12-08 NOTE — TELEPHONE ENCOUNTER
Received a fax from [employer] [facility] that the rybelsus oral tablet 14 mg a quantity of 34 for 34 days has been approved for 12 months from 12/7/23-12/7/24. Approval has been scanned into patients chart.

## 2024-01-09 ENCOUNTER — APPOINTMENT (OUTPATIENT)
Dept: LAB | Facility: CLINIC | Age: 49
End: 2024-01-09
Payer: COMMERCIAL

## 2024-01-09 DIAGNOSIS — E78.2 MIXED HYPERLIPIDEMIA: ICD-10-CM

## 2024-01-09 DIAGNOSIS — E88.810 METABOLIC SYNDROME: ICD-10-CM

## 2024-01-09 DIAGNOSIS — E11.9 TYPE 2 DIABETES MELLITUS TREATED WITHOUT INSULIN (HCC): ICD-10-CM

## 2024-01-09 DIAGNOSIS — Z12.5 PROSTATE CANCER SCREENING: ICD-10-CM

## 2024-01-09 LAB
ALBUMIN SERPL BCP-MCNC: 4.2 G/DL (ref 3.5–5)
ALP SERPL-CCNC: 65 U/L (ref 34–104)
ALT SERPL W P-5'-P-CCNC: 27 U/L (ref 7–52)
ANION GAP SERPL CALCULATED.3IONS-SCNC: 11 MMOL/L
AST SERPL W P-5'-P-CCNC: 22 U/L (ref 13–39)
BILIRUB SERPL-MCNC: 0.63 MG/DL (ref 0.2–1)
BUN SERPL-MCNC: 12 MG/DL (ref 5–25)
CALCIUM SERPL-MCNC: 9.2 MG/DL (ref 8.4–10.2)
CHLORIDE SERPL-SCNC: 103 MMOL/L (ref 96–108)
CO2 SERPL-SCNC: 26 MMOL/L (ref 21–32)
CREAT SERPL-MCNC: 0.8 MG/DL (ref 0.6–1.3)
ERYTHROCYTE [DISTWIDTH] IN BLOOD BY AUTOMATED COUNT: 15.3 % (ref 11.6–15.1)
EST. AVERAGE GLUCOSE BLD GHB EST-MCNC: 166 MG/DL
GFR SERPL CREATININE-BSD FRML MDRD: 106 ML/MIN/1.73SQ M
GLUCOSE P FAST SERPL-MCNC: 151 MG/DL (ref 65–99)
HBA1C MFR BLD: 7.4 %
HCT VFR BLD AUTO: 46.6 % (ref 36.5–49.3)
HGB BLD-MCNC: 14.4 G/DL (ref 12–17)
MCH RBC QN AUTO: 24.5 PG (ref 26.8–34.3)
MCHC RBC AUTO-ENTMCNC: 30.9 G/DL (ref 31.4–37.4)
MCV RBC AUTO: 79 FL (ref 82–98)
PLATELET # BLD AUTO: 250 THOUSANDS/UL (ref 149–390)
PMV BLD AUTO: 11.1 FL (ref 8.9–12.7)
POTASSIUM SERPL-SCNC: 4.1 MMOL/L (ref 3.5–5.3)
PROT SERPL-MCNC: 6.8 G/DL (ref 6.4–8.4)
PSA SERPL-MCNC: 0.9 NG/ML (ref 0–4)
RBC # BLD AUTO: 5.87 MILLION/UL (ref 3.88–5.62)
SODIUM SERPL-SCNC: 140 MMOL/L (ref 135–147)
TSH SERPL DL<=0.05 MIU/L-ACNC: 1.41 UIU/ML (ref 0.45–4.5)
WBC # BLD AUTO: 6.59 THOUSAND/UL (ref 4.31–10.16)

## 2024-01-09 PROCEDURE — G0103 PSA SCREENING: HCPCS

## 2024-01-09 PROCEDURE — 80053 COMPREHEN METABOLIC PANEL: CPT

## 2024-01-09 PROCEDURE — 36415 COLL VENOUS BLD VENIPUNCTURE: CPT

## 2024-01-09 PROCEDURE — 84443 ASSAY THYROID STIM HORMONE: CPT

## 2024-01-09 PROCEDURE — 85027 COMPLETE CBC AUTOMATED: CPT

## 2024-01-09 PROCEDURE — 83036 HEMOGLOBIN GLYCOSYLATED A1C: CPT

## 2024-01-11 ENCOUNTER — OFFICE VISIT (OUTPATIENT)
Dept: INTERNAL MEDICINE CLINIC | Facility: OTHER | Age: 49
End: 2024-01-11
Payer: COMMERCIAL

## 2024-01-11 VITALS
SYSTOLIC BLOOD PRESSURE: 134 MMHG | RESPIRATION RATE: 18 BRPM | TEMPERATURE: 98.4 F | BODY MASS INDEX: 34.64 KG/M2 | HEART RATE: 59 BPM | DIASTOLIC BLOOD PRESSURE: 92 MMHG | WEIGHT: 247.4 LBS | OXYGEN SATURATION: 96 % | HEIGHT: 71 IN

## 2024-01-11 DIAGNOSIS — E78.2 MIXED HYPERLIPIDEMIA: ICD-10-CM

## 2024-01-11 DIAGNOSIS — I10 PRIMARY HYPERTENSION: ICD-10-CM

## 2024-01-11 DIAGNOSIS — E66.01 SEVERE OBESITY (BMI 35.0-39.9) WITH COMORBIDITY (HCC): ICD-10-CM

## 2024-01-11 DIAGNOSIS — Z00.00 ANNUAL PHYSICAL EXAM: ICD-10-CM

## 2024-01-11 DIAGNOSIS — E11.9 TYPE 2 DIABETES MELLITUS TREATED WITHOUT INSULIN (HCC): Primary | ICD-10-CM

## 2024-01-11 PROCEDURE — 99396 PREV VISIT EST AGE 40-64: CPT | Performed by: INTERNAL MEDICINE

## 2024-01-11 PROCEDURE — 99214 OFFICE O/P EST MOD 30 MIN: CPT | Performed by: INTERNAL MEDICINE

## 2024-01-11 RX ORDER — IRBESARTAN 75 MG/1
75 TABLET ORAL DAILY
Qty: 90 TABLET | Refills: 1 | Status: SHIPPED | OUTPATIENT
Start: 2024-01-11

## 2024-01-11 NOTE — ASSESSMENT & PLAN NOTE
He is up to date on immunizations.   Discussed skin cancer screening.   Discussed diet and exercise.

## 2024-01-11 NOTE — PROGRESS NOTES
ADULT ANNUAL PHYSICAL  [facility] [place]    NAME: Telly Martinez  AGE: 48 y.o. SEX: male  : 1975     DATE: 2024     Assessment and Plan:     Problem List Items Addressed This Visit          Endocrine    Type 2 diabetes mellitus treated without insulin (HCC) - Primary     Discussed diet and exercise. Continue semaglutide.   Lab Results   Component Value Date    HGBA1C 7.4 (H) 2024          Relevant Orders    Hemoglobin A1C    Comprehensive metabolic panel    Lipid panel    Albumin / creatinine urine ratio       Cardiovascular and Mediastinum    Primary hypertension     Discontinue lisinopril due to cough. Will start irbesartan 75 mg daily.          Relevant Medications    irbesartan (AVAPRO) 75 mg tablet    Other Relevant Orders    Hemoglobin A1C    Comprehensive metabolic panel    Lipid panel    Albumin / creatinine urine ratio       Other    Mixed hyperlipidemia     Continue statin therapy. Lipid panel ordered.          Severe obesity (BMI 35.0-39.9) with comorbidity (HCC)     Discussed diet and exercise.          Annual physical exam     He is up to date on immunizations.   Discussed skin cancer screening.   Discussed diet and exercise.             Immunizations and preventive care screenings were discussed with patient today. Appropriate education was printed on patient's after visit summary.    Discussed risks and benefits of prostate cancer screening. We discussed the controversial history of PSA screening for prostate cancer in the [place] as well as the risk of over detection and over treatment of prostate cancer by way of PSA screening.  The patient understands that PSA blood testing is an imperfect way to screen for prostate cancer and that elevated PSA levels in the blood may also be caused by infection, inflammation, prostatic trauma or manipulation, urological procedures, or by benign prostatic enlargement.    The  role of the digital rectal examination in prostate cancer screening was also discussed and I discussed with him that there is large interobserver variability in the findings of digital rectal examination.    Counseling:  Alcohol/drug use: discussed moderation in alcohol intake, the recommendations for healthy alcohol use, and avoidance of illicit drug use.  Dental Health: discussed importance of regular tooth brushing, flossing, and dental visits.  Injury prevention: discussed safety/seat belts, safety helmets, smoke detectors, carbon dioxide detectors, and smoking near bedding or upholstery.  Sexual health: discussed sexually transmitted diseases, partner selection, use of condoms, avoidance of unintended pregnancy, and contraceptive alternatives.  Exercise: the importance of regular exercise/physical activity was discussed. Recommend exercise 3-5 times per week for at least 30 minutes.          Return in about 3 months (around 4/11/2024) for Next scheduled follow up.     Chief Complaint:     Chief Complaint   Patient presents with    Follow-up     6 month - labs done 1/9/24    hm     Urine micro due after 2/18/24, foot exam    Physical Exam    Medication Problem     Lisinopril gave him a cough wanted to see about starting something else. Also wanted to talk abould the rebelsis     Hypertension     elevated      History of Present Illness:     Adult Annual Physical   Patient here for a comprehensive physical exam. The patient reports problems -      Telly Martinez is seen today for follow up of chronic conditions.   Recent laboratory studies reviewed today with the patient.  A1c increased to 7.4%  he has been compliant with his medication regimen.   1.  Hypertension: He was on lisinopril however stopped taking it due to a cough.  2.  Diabetes mellitus: He has been compliant with his diabetic regimen.      Diet and Physical Activity  Diet/Nutrition: well balanced diet, consuming 3-5 servings of fruits/vegetables daily,  adequate fiber intake, and adequate whole grain intake.   Exercise: 5-7 times a week on average.      Depression Screening  PHQ-2/9 Depression Screening    Little interest or pleasure in doing things: 0 - not at all  Feeling down, depressed, or hopeless: 0 - not at all  Trouble falling or staying asleep, or sleeping too much: 0 - not at all  Feeling tired or having little energy: 0 - not at all  Poor appetite or overeatin - not at all  Feeling bad about yourself - or that you are a failure or have let yourself or your family down: 0 - not at all  Trouble concentrating on things, such as reading the newspaper or watching television: 0 - not at all  Moving or speaking so slowly that other people could have noticed. Or the opposite - being so fidgety or restless that you have been moving around a lot more than usual: 0 - not at all  Thoughts that you would be better off dead, or of hurting yourself in some way: 0 - not at all       General Health  Sleep: sleeps well and gets 7-8 hours of sleep on average.   Hearing: normal - bilateral.  Vision: no vision problems.   Dental: regular dental visits and brushes teeth twice daily.        Health  Symptoms include: none     Review of Systems:     Review of Systems   Constitutional:  Negative for activity change, appetite change, chills, diaphoresis, fatigue and fever.   HENT:  Negative for congestion, postnasal drip, rhinorrhea, sinus pressure, sinus pain, sneezing and sore throat.    Eyes:  Negative for visual disturbance.   Respiratory:  Negative for apnea, cough, choking, chest tightness, shortness of breath and wheezing.    Cardiovascular:  Negative for chest pain, palpitations and leg swelling.   Gastrointestinal:  Negative for abdominal distention, abdominal pain, anal bleeding, blood in stool, constipation, diarrhea, nausea and vomiting.   Endocrine: Negative for cold intolerance and heat intolerance.   Genitourinary:  Negative for difficulty urinating, dysuria  and hematuria.   Musculoskeletal: Negative.    Skin: Negative.    Neurological:  Negative for dizziness, weakness, light-headedness, numbness and headaches.   Hematological:  Negative for adenopathy.   Psychiatric/Behavioral:  Negative for agitation, sleep disturbance and suicidal ideas.    All other systems reviewed and are negative.     Past Medical History:     Past Medical History:   Diagnosis Date    Allergic     Anxiety     Bipolar affective disorder ([facility])     sees Dr. Joseph    Depression     Diabetes mellitus ([facility])     Lactose intolerance 2022    Obesity     Primary hypertension 2023    Type 2 diabetes mellitus treated without insulin ([facility]) 2020      Past Surgical History:     History reviewed. No pertinent surgical history.   Family History:     Family History   Problem Relation Age of Onset    Hyperlipidemia Mother     Hypertension Father     Breast cancer Paternal Grandmother     Hypertension Paternal Grandmother       Social History:     Social History     Socioeconomic History    Marital status: Single     Spouse name: None    Number of children: None    Years of education: None    Highest education level: None   Occupational History    Occupation: unemployed   Tobacco Use    Smoking status: Former     Current packs/day: 0.00     Average packs/day: 0.3 packs/day for 5.0 years (1.3 ttl pk-yrs)     Types: Cigarettes     Start date:      Quit date:      Years since quittin.0    Smokeless tobacco: Never   Vaping Use    Vaping status: Never Used   Substance and Sexual Activity    Alcohol use: Not Currently    Drug use: Not Currently     Comment: drug use in past- lcd, marijuana, katamine, cocaine    Sexual activity: Yes   Other Topics Concern    None   Social History Narrative    From [place] fluent in both languages     Social Determinants of Health     Financial Resource Strain: Not on file   Food Insecurity: Not on file   Transportation Needs: Not on file   Physical Activity:  "Not on file   Stress: Not on file   Social Connections: Not on file   Intimate Partner Violence: Not on file   Housing Stability: Not on file      Current Medications:     Current Outpatient Medications   Medication Sig Dispense Refill    ARIPiprazole (ABILIFY) 5 mg tablet Take 1 tablet by mouth daily       Biotin 1000 MCG CHEW Chew in the morning dissolvable      cetirizine (ZyrTEC) 5 MG tablet Take 5 mg by mouth daily        cholecalciferol (VITAMIN D3) 1,000 units tablet Take 1,000 Units by mouth every other day      escitalopram (LEXAPRO) 20 mg tablet Take 20 mg by mouth daily      fluticasone (FLONASE) 50 mcg/act nasal spray 2 sprays into each nostril as needed       glucose blood test strip Test twice daily Dx: Diabetes E11.9  one touch ultra 2 test strips 200 each 1    irbesartan (AVAPRO) 75 mg tablet Take 1 tablet (75 mg total) by mouth daily 90 tablet 1    Multiple Vitamins-Minerals (CENTRUM SILVER PO) Take by mouth      omega-3-acid ethyl esters (LOVAZA) 1 g capsule Take 1 capsule (1 g total) by mouth 2 (two) times a day 60 capsule 5    rosuvastatin (CRESTOR) 10 MG tablet TAKE 1 TABLET BY MOUTH EVERY DAY 90 tablet 3    semaglutide (Rybelsus) 14 MG tablet Take 1 tablet (14 mg total) by mouth in the morning 30 tablet 11    zolpidem (AMBIEN) 10 mg tablet Take 10 mg by mouth daily at bedtime        No current facility-administered medications for this visit.      Allergies:     Allergies   Allergen Reactions    Seasonal Ic [Octacosanol] Nasal Congestion      Physical Exam:     /92 (BP Location: Left arm, Patient Position: Sitting, Cuff Size: Large)   Pulse 59   Temp 98.4 °F (36.9 °C) (Temporal)   Resp 18   Ht 5' 11\" (1.803 m)   Wt 112 kg (247 lb 6.4 oz)   SpO2 96%   BMI 34.51 kg/m²     Physical Exam  Vitals and nursing note reviewed.   Constitutional:       General: He is not in acute distress.     Appearance: Normal appearance. He is normal weight. He is not ill-appearing, toxic-appearing or " diaphoretic.   HENT:      Head: Normocephalic and atraumatic.      Right Ear: Tympanic membrane, ear canal and external ear normal. There is no impacted cerumen.      Left Ear: Tympanic membrane, ear canal and external ear normal. There is no impacted cerumen.      Nose: Nose normal. No congestion or rhinorrhea.      Mouth/Throat:      Mouth: Mucous membranes are moist.      Pharynx: Oropharynx is clear. No oropharyngeal exudate or posterior oropharyngeal erythema.   Eyes:      General: No scleral icterus.        Right eye: No discharge.         Left eye: No discharge.      Extraocular Movements: Extraocular movements intact.      Conjunctiva/sclera: Conjunctivae normal.      Pupils: Pupils are equal, round, and reactive to light.   Neck:      Vascular: No carotid bruit.   Cardiovascular:      Rate and Rhythm: Normal rate and regular rhythm.      Pulses: Normal pulses.      Heart sounds: Normal heart sounds. No murmur heard.     No friction rub. No gallop.   Pulmonary:      Effort: Pulmonary effort is normal. No respiratory distress.      Breath sounds: Normal breath sounds. No wheezing or rales.   Abdominal:      General: Abdomen is flat. Bowel sounds are normal. There is no distension.      Palpations: Abdomen is soft. There is no mass.      Tenderness: There is no abdominal tenderness. There is no guarding.      Hernia: No hernia is present.   Musculoskeletal:         General: No swelling, tenderness, deformity or signs of injury. Normal range of motion.      Cervical back: Normal range of motion and neck supple. No rigidity. No muscular tenderness.      Right lower leg: No edema.      Left lower leg: No edema.   Lymphadenopathy:      Cervical: No cervical adenopathy.   Skin:     General: Skin is warm and dry.      Capillary Refill: Capillary refill takes less than 2 seconds.      Coloration: Skin is not jaundiced or pale.      Findings: No bruising, erythema, lesion or rash.   Neurological:      General: No  focal deficit present.      Mental Status: He is alert and oriented to person, place, and time. Mental status is at baseline.      Cranial Nerves: No cranial nerve deficit.      Sensory: No sensory deficit.      Motor: No weakness.      Coordination: Coordination normal.      Gait: Gait normal.      Deep Tendon Reflexes: Reflexes normal.   Psychiatric:         Mood and Affect: Mood normal.         Behavior: Behavior normal.         Thought Content: Thought content normal.         Judgment: Judgment normal.          Cash Damon MD  [facility]

## 2024-01-11 NOTE — ASSESSMENT & PLAN NOTE
Discussed diet and exercise. Continue semaglutide.   Lab Results   Component Value Date    HGBA1C 7.4 (H) 01/09/2024

## 2024-02-07 DIAGNOSIS — E78.2 MIXED HYPERLIPIDEMIA: ICD-10-CM

## 2024-02-07 RX ORDER — ROSUVASTATIN CALCIUM 10 MG/1
TABLET, COATED ORAL
Qty: 90 TABLET | Refills: 1 | Status: SHIPPED | OUTPATIENT
Start: 2024-02-07

## 2024-04-09 ENCOUNTER — TELEPHONE (OUTPATIENT)
Dept: INTERNAL MEDICINE CLINIC | Facility: OTHER | Age: 49
End: 2024-04-09

## 2024-04-09 DIAGNOSIS — Z84.89 FAMILY HISTORY OF GENETIC DISEASE: Primary | ICD-10-CM

## 2024-04-09 NOTE — TELEPHONE ENCOUNTER
Patient called stating his mother recently came back positive for having the alpha globin gene and he is asking since he is getting lab work done tomorrow if there is an order that could be put in for his chart to get tested for this as well

## 2024-04-10 ENCOUNTER — APPOINTMENT (OUTPATIENT)
Dept: LAB | Facility: CLINIC | Age: 49
End: 2024-04-10
Payer: COMMERCIAL

## 2024-04-10 DIAGNOSIS — E11.9 TYPE 2 DIABETES MELLITUS TREATED WITHOUT INSULIN (HCC): ICD-10-CM

## 2024-04-10 DIAGNOSIS — I10 PRIMARY HYPERTENSION: ICD-10-CM

## 2024-04-10 LAB
ALBUMIN SERPL BCP-MCNC: 4.2 G/DL (ref 3.5–5)
ALP SERPL-CCNC: 68 U/L (ref 34–104)
ALT SERPL W P-5'-P-CCNC: 27 U/L (ref 7–52)
ANION GAP SERPL CALCULATED.3IONS-SCNC: 9 MMOL/L (ref 4–13)
AST SERPL W P-5'-P-CCNC: 22 U/L (ref 13–39)
BASOPHILS # BLD AUTO: 0.06 THOUSANDS/ÂΜL (ref 0–0.1)
BASOPHILS NFR BLD AUTO: 1 % (ref 0–1)
BILIRUB SERPL-MCNC: 0.72 MG/DL (ref 0.2–1)
BUN SERPL-MCNC: 9 MG/DL (ref 5–25)
CALCIUM SERPL-MCNC: 9 MG/DL (ref 8.4–10.2)
CHLORIDE SERPL-SCNC: 102 MMOL/L (ref 96–108)
CHOLEST SERPL-MCNC: 182 MG/DL
CO2 SERPL-SCNC: 28 MMOL/L (ref 21–32)
CREAT SERPL-MCNC: 0.92 MG/DL (ref 0.6–1.3)
CREAT UR-MCNC: 178.5 MG/DL
EOSINOPHIL # BLD AUTO: 0.37 THOUSAND/ÂΜL (ref 0–0.61)
EOSINOPHIL NFR BLD AUTO: 6 % (ref 0–6)
ERYTHROCYTE [DISTWIDTH] IN BLOOD BY AUTOMATED COUNT: 15.4 % (ref 11.6–15.1)
EST. AVERAGE GLUCOSE BLD GHB EST-MCNC: 151 MG/DL
GFR SERPL CREATININE-BSD FRML MDRD: 98 ML/MIN/1.73SQ M
GLUCOSE P FAST SERPL-MCNC: 142 MG/DL (ref 65–99)
HBA1C MFR BLD: 6.9 %
HCT VFR BLD AUTO: 48.7 % (ref 36.5–49.3)
HDLC SERPL-MCNC: 48 MG/DL
HGB BLD-MCNC: 14.9 G/DL (ref 12–17)
IMM GRANULOCYTES # BLD AUTO: 0.01 THOUSAND/UL (ref 0–0.2)
IMM GRANULOCYTES NFR BLD AUTO: 0 % (ref 0–2)
LDLC SERPL CALC-MCNC: 101 MG/DL (ref 0–100)
LYMPHOCYTES # BLD AUTO: 2.46 THOUSANDS/ÂΜL (ref 0.6–4.47)
LYMPHOCYTES NFR BLD AUTO: 40 % (ref 14–44)
MCH RBC QN AUTO: 24.7 PG (ref 26.8–34.3)
MCHC RBC AUTO-ENTMCNC: 30.6 G/DL (ref 31.4–37.4)
MCV RBC AUTO: 81 FL (ref 82–98)
MICROALBUMIN UR-MCNC: 10.7 MG/L
MICROALBUMIN/CREAT 24H UR: 6 MG/G CREATININE (ref 0–30)
MONOCYTES # BLD AUTO: 0.47 THOUSAND/ÂΜL (ref 0.17–1.22)
MONOCYTES NFR BLD AUTO: 8 % (ref 4–12)
NEUTROPHILS # BLD AUTO: 2.76 THOUSANDS/ÂΜL (ref 1.85–7.62)
NEUTS SEG NFR BLD AUTO: 45 % (ref 43–75)
NONHDLC SERPL-MCNC: 134 MG/DL
NRBC BLD AUTO-RTO: 0 /100 WBCS
PLATELET # BLD AUTO: 274 THOUSANDS/UL (ref 149–390)
PMV BLD AUTO: 11.6 FL (ref 8.9–12.7)
POTASSIUM SERPL-SCNC: 4.3 MMOL/L (ref 3.5–5.3)
PROT SERPL-MCNC: 6.6 G/DL (ref 6.4–8.4)
RBC # BLD AUTO: 6.04 MILLION/UL (ref 3.88–5.62)
SODIUM SERPL-SCNC: 139 MMOL/L (ref 135–147)
TRIGL SERPL-MCNC: 164 MG/DL
WBC # BLD AUTO: 6.13 THOUSAND/UL (ref 4.31–10.16)

## 2024-04-10 PROCEDURE — 80061 LIPID PANEL: CPT

## 2024-04-10 PROCEDURE — 36415 COLL VENOUS BLD VENIPUNCTURE: CPT

## 2024-04-10 PROCEDURE — 80053 COMPREHEN METABOLIC PANEL: CPT

## 2024-04-10 PROCEDURE — 82570 ASSAY OF URINE CREATININE: CPT

## 2024-04-10 PROCEDURE — 83036 HEMOGLOBIN GLYCOSYLATED A1C: CPT

## 2024-04-10 PROCEDURE — 82043 UR ALBUMIN QUANTITATIVE: CPT

## 2024-04-10 PROCEDURE — 85025 COMPLETE CBC W/AUTO DIFF WBC: CPT

## 2024-04-16 ENCOUNTER — OFFICE VISIT (OUTPATIENT)
Dept: INTERNAL MEDICINE CLINIC | Facility: OTHER | Age: 49
End: 2024-04-16
Payer: COMMERCIAL

## 2024-04-16 ENCOUNTER — APPOINTMENT (OUTPATIENT)
Dept: LAB | Facility: CLINIC | Age: 49
End: 2024-04-16

## 2024-04-16 VITALS
WEIGHT: 238 LBS | BODY MASS INDEX: 33.32 KG/M2 | DIASTOLIC BLOOD PRESSURE: 82 MMHG | OXYGEN SATURATION: 96 % | TEMPERATURE: 98.3 F | HEART RATE: 63 BPM | SYSTOLIC BLOOD PRESSURE: 120 MMHG | HEIGHT: 71 IN

## 2024-04-16 DIAGNOSIS — D56.3 THALASSEMIA TRAIT: ICD-10-CM

## 2024-04-16 DIAGNOSIS — F31.31 BIPOLAR AFFECTIVE DISORDER, CURRENTLY DEPRESSED, MILD (HCC): ICD-10-CM

## 2024-04-16 DIAGNOSIS — E11.9 TYPE 2 DIABETES MELLITUS TREATED WITHOUT INSULIN (HCC): Primary | ICD-10-CM

## 2024-04-16 DIAGNOSIS — Z84.89 FAMILY HISTORY OF GENETIC DISEASE: ICD-10-CM

## 2024-04-16 DIAGNOSIS — E78.2 MIXED HYPERLIPIDEMIA: ICD-10-CM

## 2024-04-16 DIAGNOSIS — I10 PRIMARY HYPERTENSION: ICD-10-CM

## 2024-04-16 PROBLEM — E88.810 METABOLIC SYNDROME: Status: RESOLVED | Noted: 2022-08-04 | Resolved: 2024-04-16

## 2024-04-16 PROCEDURE — 99214 OFFICE O/P EST MOD 30 MIN: CPT | Performed by: INTERNAL MEDICINE

## 2024-04-16 NOTE — ASSESSMENT & PLAN NOTE
Discussed continuing diet and exercise. Monitor off diabetic medication.   Lab Results   Component Value Date    HGBA1C 6.9 (H) 04/10/2024

## 2024-04-16 NOTE — PROGRESS NOTES
Assessment/Plan:    Primary hypertension  Controlled, continue current antihypertensive regimen.     Type 2 diabetes mellitus treated without insulin (HCC)  Discussed continuing diet and exercise. Monitor off diabetic medication.   Lab Results   Component Value Date    HGBA1C 6.9 (H) 04/10/2024     Bipolar affective disorder, currently depressed, mild (HCC)  Continue current regimen and f/u with psychiatry.     Thalassemia trait  Will follow up screening test for alpha thalassemia.     Mixed hyperlipidemia  Discussed diet and exercise. Continue rosuvastatin and omega 3. .        Diagnoses and all orders for this visit:    Type 2 diabetes mellitus treated without insulin (HCC)  -     CBC; Future  -     Comprehensive metabolic panel; Future  -     Hemoglobin A1C; Future    Primary hypertension    Bipolar affective disorder, currently depressed, mild (HCC)    Thalassemia trait  -     CBC; Future    Mixed hyperlipidemia                  Subjective:      Patient ID: Telly Martinez is a 49 y.o. male.    Chief Complaint   Patient presents with   • Follow-up     3 month follow up  Labs done 4/10    • Medication Problem     Nausea, diarrhea, lump on neck noticed when taking rybelsus 14mg- stopped taking late feb.        Telly Martinez is seen today for follow up of chronic conditions.   Recent laboratory studies reviewed today with the patient. He has a FH of alpha thalassemia to which his CBC appears consistent with that. He denies any symptoms. A1c: 6.9%.   he has been compliant with his medication regimen.   He was experiencing diarrhea and nausea. He felt it was due to Rybelsus and stopped taking it. Symptoms subsided with discontinuing Rybelsus.   He developed a swelling/lump on the upper back which has subsided.   He sees his psychiatrist regularly.   he has no complaints or concerns at this time.       Diabetes  He presents for his follow-up diabetic visit. He has type 2 diabetes mellitus. His disease course has been  improving. Pertinent negatives for hypoglycemia include no dizziness or headaches. There are no diabetic associated symptoms. Pertinent negatives for diabetes include no chest pain, no fatigue and no weakness. There are no hypoglycemic complications. Symptoms are stable. There are no diabetic complications. Risk factors for coronary artery disease include diabetes mellitus, dyslipidemia, hypertension and male sex. An ACE inhibitor/angiotensin II receptor blocker is being taken.   Hypertension  This is a chronic problem. The current episode started more than 1 year ago. The problem is unchanged. The problem is controlled. Associated symptoms include anxiety. Pertinent negatives include no chest pain, headaches, palpitations or shortness of breath. Past treatments include angiotensin blockers. The current treatment provides moderate improvement. There are no compliance problems.    Hyperlipidemia  This is a chronic problem. The current episode started more than 1 year ago. The problem is controlled. Recent lipid tests were reviewed and are normal. Pertinent negatives include no chest pain or shortness of breath. Current antihyperlipidemic treatment includes statins. The current treatment provides moderate improvement of lipids. There are no compliance problems.    Anxiety  Presents for follow-up visit. Patient reports no chest pain, dizziness, nausea, palpitations, shortness of breath or suicidal ideas. Symptoms occur rarely. The severity of symptoms is mild. The quality of sleep is good. Nighttime awakenings: none.     Compliance with medications is %.       The following portions of the patient's history were reviewed and updated as appropriate: allergies, current medications, past family history, past medical history, past social history, past surgical history, and problem list.    Review of Systems   Constitutional:  Negative for activity change, appetite change, chills, diaphoresis, fatigue and fever.    HENT:  Negative for congestion, postnasal drip, rhinorrhea, sinus pressure, sinus pain, sneezing and sore throat.    Eyes:  Negative for visual disturbance.   Respiratory:  Negative for apnea, cough, choking, chest tightness, shortness of breath and wheezing.    Cardiovascular:  Negative for chest pain, palpitations and leg swelling.   Gastrointestinal:  Negative for abdominal distention, abdominal pain, anal bleeding, blood in stool, constipation, diarrhea, nausea and vomiting.   Endocrine: Negative for cold intolerance and heat intolerance.   Genitourinary:  Negative for difficulty urinating, dysuria and hematuria.   Musculoskeletal: Negative.    Skin: Negative.    Neurological:  Negative for dizziness, weakness, light-headedness, numbness and headaches.   Hematological:  Negative for adenopathy.   Psychiatric/Behavioral:  Negative for agitation, sleep disturbance and suicidal ideas.    All other systems reviewed and are negative.        Past Medical History:   Diagnosis Date   • Allergic    • Anxiety    • Bipolar affective disorder (HCC)     sees Dr. Joseph   • Depression    • Diabetes mellitus ([facility])    • Lactose intolerance 8/4/2022   • Obesity    • Primary hypertension 11/20/2023   • Type 2 diabetes mellitus treated without insulin ([facility]) 11/17/2020         Current Outpatient Medications:   •  ARIPiprazole (ABILIFY) 5 mg tablet, Take 1 tablet by mouth daily , Disp: , Rfl:   •  Biotin 1000 MCG CHEW, Chew in the morning dissolvable, Disp: , Rfl:   •  cetirizine (ZyrTEC) 5 MG tablet, Take 5 mg by mouth daily  , Disp: , Rfl:   •  cholecalciferol (VITAMIN D3) 1,000 units tablet, Take 1,000 Units by mouth every other day, Disp: , Rfl:   •  escitalopram (LEXAPRO) 20 mg tablet, Take 20 mg by mouth daily, Disp: , Rfl:   •  fluticasone (FLONASE) 50 mcg/act nasal spray, 2 sprays into each nostril as needed , Disp: , Rfl:   •  glucose blood test strip, Test twice daily Dx: Diabetes E11.9  one touch ultra 2 test strips,  "Disp: 200 each, Rfl: 1  •  irbesartan (AVAPRO) 75 mg tablet, Take 1 tablet (75 mg total) by mouth daily, Disp: 90 tablet, Rfl: 1  •  Multiple Vitamins-Minerals (CENTRUM SILVER PO), Take by mouth, Disp: , Rfl:   •  omega-3-acid ethyl esters (LOVAZA) 1 g capsule, Take 1 capsule (1 g total) by mouth 2 (two) times a day, Disp: 60 capsule, Rfl: 5  •  rosuvastatin (CRESTOR) 10 MG tablet, TAKE 1 TABLET BY MOUTH EVERY DAY, Disp: 90 tablet, Rfl: 1  •  zolpidem (AMBIEN) 10 mg tablet, Take 10 mg by mouth daily at bedtime , Disp: , Rfl:     Allergies   Allergen Reactions   • Seasonal Ic [Octacosanol] Nasal Congestion       Social History   History reviewed. No pertinent surgical history.  Family History   Problem Relation Age of Onset   • Hyperlipidemia Mother    • Hypertension Father    • Breast cancer Paternal Grandmother    • Hypertension Paternal Grandmother        Objective:  /82 (BP Location: Left arm, Patient Position: Sitting, Cuff Size: Large)   Pulse 63   Temp 98.3 °F (36.8 °C) (Temporal)   Ht 5' 11\" (1.803 m)   Wt 108 kg (238 lb)   SpO2 96%   BMI 33.19 kg/m²     Recent Results (from the past 1344 hour(s))   CBC and differential    Collection Time: 04/10/24  7:24 AM   Result Value Ref Range    WBC 6.13 4.31 - 10.16 Thousand/uL    RBC 6.04 (H) 3.88 - 5.62 Million/uL    Hemoglobin 14.9 12.0 - 17.0 g/dL    Hematocrit 48.7 36.5 - 49.3 %    MCV 81 (L) 82 - 98 fL    MCH 24.7 (L) 26.8 - 34.3 pg    MCHC 30.6 (L) 31.4 - 37.4 g/dL    RDW 15.4 (H) 11.6 - 15.1 %    MPV 11.6 8.9 - 12.7 fL    Platelets 274 149 - 390 Thousands/uL    nRBC 0 /100 WBCs    Segmented % 45 43 - 75 %    Immature Grans % 0 0 - 2 %    Lymphocytes % 40 14 - 44 %    Monocytes % 8 4 - 12 %    Eosinophils Relative 6 0 - 6 %    Basophils Relative 1 0 - 1 %    Absolute Neutrophils 2.76 1.85 - 7.62 Thousands/µL    Absolute Immature Grans 0.01 0.00 - 0.20 Thousand/uL    Absolute Lymphocytes 2.46 0.60 - 4.47 Thousands/µL    Absolute Monocytes 0.47 0.17 - " 1.22 Thousand/µL    Eosinophils Absolute 0.37 0.00 - 0.61 Thousand/µL    Basophils Absolute 0.06 0.00 - 0.10 Thousands/µL   Hemoglobin A1C    Collection Time: 04/10/24  7:24 AM   Result Value Ref Range    Hemoglobin A1C 6.9 (H) Normal 4.0-5.6%; PreDiabetic 5.7-6.4%; Diabetic >=6.5%; Glycemic control for adults with diabetes <7.0% %     mg/dl   Comprehensive metabolic panel    Collection Time: 04/10/24  7:24 AM   Result Value Ref Range    Sodium 139 135 - 147 mmol/L    Potassium 4.3 3.5 - 5.3 mmol/L    Chloride 102 96 - 108 mmol/L    CO2 28 21 - 32 mmol/L    ANION GAP 9 4 - 13 mmol/L    BUN 9 5 - 25 mg/dL    Creatinine 0.92 0.60 - 1.30 mg/dL    Glucose, Fasting 142 (H) 65 - 99 mg/dL    Calcium 9.0 8.4 - 10.2 mg/dL    AST 22 13 - 39 U/L    ALT 27 7 - 52 U/L    Alkaline Phosphatase 68 34 - 104 U/L    Total Protein 6.6 6.4 - 8.4 g/dL    Albumin 4.2 3.5 - 5.0 g/dL    Total Bilirubin 0.72 0.20 - 1.00 mg/dL    eGFR 98 ml/min/1.73sq m   Lipid panel    Collection Time: 04/10/24  7:24 AM   Result Value Ref Range    Cholesterol 182 See Comment mg/dL    Triglycerides 164 (H) See Comment mg/dL    HDL, Direct 48 >=40 mg/dL    LDL Calculated 101 (H) 0 - 100 mg/dL    Non-HDL-Chol (CHOL-HDL) 134 mg/dl   Albumin / creatinine urine ratio    Collection Time: 04/10/24  7:24 AM   Result Value Ref Range    Creatinine, Ur 178.5 Reference range not established. mg/dL    Albumin,U,Random 10.7 <20.0 mg/L    Albumin Creat Ratio 6 0 - 30 mg/g creatinine            Physical Exam  Vitals and nursing note reviewed.   Constitutional:       General: He is not in acute distress.     Appearance: He is well-developed. He is not diaphoretic.   HENT:      Head: Normocephalic and atraumatic.   Eyes:      General: No scleral icterus.        Right eye: No discharge.         Left eye: No discharge.      Conjunctiva/sclera: Conjunctivae normal.      Pupils: Pupils are equal, round, and reactive to light.   Neck:      Thyroid: No thyromegaly.       Vascular: No JVD.   Cardiovascular:      Rate and Rhythm: Normal rate and regular rhythm.      Heart sounds: Normal heart sounds. No murmur heard.     No friction rub. No gallop.   Pulmonary:      Effort: Pulmonary effort is normal. No respiratory distress.      Breath sounds: Normal breath sounds. No wheezing or rales.   Chest:      Chest wall: No tenderness.   Abdominal:      General: Bowel sounds are normal. There is no distension.      Palpations: Abdomen is soft. There is no mass.      Tenderness: There is no abdominal tenderness. There is no guarding or rebound.   Musculoskeletal:         General: No tenderness or deformity. Normal range of motion.      Cervical back: Normal range of motion and neck supple.   Lymphadenopathy:      Cervical: No cervical adenopathy.   Skin:     General: Skin is warm and dry.      Coloration: Skin is not pale.      Findings: No erythema or rash.   Neurological:      Mental Status: He is alert and oriented to person, place, and time.      Cranial Nerves: No cranial nerve deficit.      Coordination: Coordination normal.      Deep Tendon Reflexes: Reflexes are normal and symmetric.   Psychiatric:         Behavior: Behavior normal.         Thought Content: Thought content normal.         Judgment: Judgment normal.

## 2024-05-01 LAB — MISCELLANEOUS LAB TEST RESULT: NORMAL

## 2024-05-02 DIAGNOSIS — D56.3 THALASSEMIA TRAIT: Primary | ICD-10-CM

## 2024-05-03 ENCOUNTER — TELEPHONE (OUTPATIENT)
Dept: HEMATOLOGY ONCOLOGY | Facility: CLINIC | Age: 49
End: 2024-05-03

## 2024-05-03 NOTE — TELEPHONE ENCOUNTER
I called Telly in response to a referral that was received for patient to establish care with Hematology.     Outreach was made to schedule a consultation.    A consultation was scheduled for patient during this call. Patient is scheduled on 6/21/24 at 11 with Carlos at the [facility]

## 2024-05-07 NOTE — TELEPHONE ENCOUNTER
Patient called requesting refill for Crestor. Patient made aware medication was refilled on 2/7/24 for 90 tablets with 1 refill to [facility] pharmacy. Patient instructed to contact the pharmacy to obtain refills of medication. Patient verbalized understanding.

## 2024-06-18 DIAGNOSIS — E78.2 MIXED HYPERLIPIDEMIA: ICD-10-CM

## 2024-06-18 DIAGNOSIS — I10 PRIMARY HYPERTENSION: ICD-10-CM

## 2024-06-19 RX ORDER — IRBESARTAN 75 MG/1
75 TABLET ORAL DAILY
Qty: 90 TABLET | Refills: 1 | Status: SHIPPED | OUTPATIENT
Start: 2024-06-19

## 2024-06-19 RX ORDER — ROSUVASTATIN CALCIUM 10 MG/1
10 TABLET, COATED ORAL DAILY
Qty: 90 TABLET | Refills: 1 | OUTPATIENT
Start: 2024-06-19

## 2024-06-21 ENCOUNTER — OFFICE VISIT (OUTPATIENT)
Dept: HEMATOLOGY ONCOLOGY | Facility: CLINIC | Age: 49
End: 2024-06-21
Payer: COMMERCIAL

## 2024-06-21 ENCOUNTER — APPOINTMENT (OUTPATIENT)
Dept: LAB | Facility: CLINIC | Age: 49
End: 2024-06-21
Payer: COMMERCIAL

## 2024-06-21 VITALS
BODY MASS INDEX: 31.5 KG/M2 | SYSTOLIC BLOOD PRESSURE: 128 MMHG | HEART RATE: 64 BPM | WEIGHT: 225 LBS | TEMPERATURE: 97.6 F | DIASTOLIC BLOOD PRESSURE: 70 MMHG | HEIGHT: 71 IN | OXYGEN SATURATION: 97 % | RESPIRATION RATE: 16 BRPM

## 2024-06-21 DIAGNOSIS — R71.8 RBC MICROCYTOSIS: ICD-10-CM

## 2024-06-21 DIAGNOSIS — D56.3 THALASSEMIA TRAIT, ALPHA: ICD-10-CM

## 2024-06-21 DIAGNOSIS — D56.3 THALASSEMIA TRAIT, ALPHA: Primary | ICD-10-CM

## 2024-06-21 LAB
BASOPHILS # BLD AUTO: 0.06 THOUSANDS/ÂΜL (ref 0–0.1)
BASOPHILS NFR BLD AUTO: 1 % (ref 0–1)
EOSINOPHIL # BLD AUTO: 0.27 THOUSAND/ÂΜL (ref 0–0.61)
EOSINOPHIL NFR BLD AUTO: 5 % (ref 0–6)
ERYTHROCYTE [DISTWIDTH] IN BLOOD BY AUTOMATED COUNT: 15 % (ref 11.6–15.1)
FERRITIN SERPL-MCNC: 25 NG/ML (ref 24–336)
FOLATE SERPL-MCNC: >22.3 NG/ML
HCT VFR BLD AUTO: 47.4 % (ref 36.5–49.3)
HGB BLD-MCNC: 14.6 G/DL (ref 12–17)
IMM GRANULOCYTES # BLD AUTO: 0.01 THOUSAND/UL (ref 0–0.2)
IMM GRANULOCYTES NFR BLD AUTO: 0 % (ref 0–2)
IRON SATN MFR SERPL: 20 % (ref 15–50)
IRON SERPL-MCNC: 74 UG/DL (ref 50–212)
LYMPHOCYTES # BLD AUTO: 2.55 THOUSANDS/ÂΜL (ref 0.6–4.47)
LYMPHOCYTES NFR BLD AUTO: 43 % (ref 14–44)
MCH RBC QN AUTO: 24.3 PG (ref 26.8–34.3)
MCHC RBC AUTO-ENTMCNC: 30.8 G/DL (ref 31.4–37.4)
MCV RBC AUTO: 79 FL (ref 82–98)
MONOCYTES # BLD AUTO: 0.46 THOUSAND/ÂΜL (ref 0.17–1.22)
MONOCYTES NFR BLD AUTO: 8 % (ref 4–12)
NEUTROPHILS # BLD AUTO: 2.49 THOUSANDS/ÂΜL (ref 1.85–7.62)
NEUTS SEG NFR BLD AUTO: 43 % (ref 43–75)
NRBC BLD AUTO-RTO: 0 /100 WBCS
PLATELET # BLD AUTO: 251 THOUSANDS/UL (ref 149–390)
PMV BLD AUTO: 12.4 FL (ref 8.9–12.7)
RBC # BLD AUTO: 6 MILLION/UL (ref 3.88–5.62)
TIBC SERPL-MCNC: 367 UG/DL (ref 250–450)
UIBC SERPL-MCNC: 293 UG/DL (ref 155–355)
VIT B12 SERPL-MCNC: 457 PG/ML (ref 180–914)
WBC # BLD AUTO: 5.84 THOUSAND/UL (ref 4.31–10.16)

## 2024-06-21 PROCEDURE — 82746 ASSAY OF FOLIC ACID SERUM: CPT

## 2024-06-21 PROCEDURE — 82607 VITAMIN B-12: CPT

## 2024-06-21 PROCEDURE — 36415 COLL VENOUS BLD VENIPUNCTURE: CPT

## 2024-06-21 PROCEDURE — 82728 ASSAY OF FERRITIN: CPT

## 2024-06-21 PROCEDURE — 83540 ASSAY OF IRON: CPT

## 2024-06-21 PROCEDURE — 85025 COMPLETE CBC W/AUTO DIFF WBC: CPT

## 2024-06-21 PROCEDURE — 83550 IRON BINDING TEST: CPT

## 2024-06-21 PROCEDURE — 99243 OFF/OP CNSLTJ NEW/EST LOW 30: CPT | Performed by: PHYSICIAN ASSISTANT

## 2024-06-21 NOTE — PROGRESS NOTES
[address]  [facility] [place]  [address]  [place] [id number]  Hematology Ambulatory Consult  Telly Martinez, 1975, [id number]  6/21/2024    Assessment and Plan   1. Thalassemia trait, alpha; 2. 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  Patient has a long family history including his mother and grandmother.  Patient does have aunts and cousins that also have alpha thalassemia trait.  Patient does not have biological children.    I counseled the patient regarding alpha thalassemia trait.  Biggest risk factor for the patient at present is the risk of iron overload with oversupplementation of iron.  We discussed that commonly people see thalassemia laboratory assessment and may recommend iron.  At this time, no additional iron is recommended beyond what the patient is taking with of regular multivitamin.  Patient will go for blood work today to make sure that other substrates are all in line.  However, if these are all normal, no additional supplementation is necessary.  Patient understands that actively, patient does not need to follow-up with hematology however in the future if he develops anemia it would be good to touch base with us to verify and to investigate causes of anemia.    Patient had a very good understanding of his condition.  I remain available to him via [employer] if there is any questions or concerns that were not addressed today at the appointment.  A copy of the note will be sent to his PCP.    - Ambulatory Referral to Hematology / Oncology  - CBC and differential; Future  - Iron Panel (Includes Ferritin, Iron Sat%, Iron, and TIBC); Future  - Vitamin B12; Future  - Folate; Future    The patient is scheduled for follow-up in approximately PRN.      Patient voiced agreement and understanding to the above.   Patient knows to call the Hematology/Oncology office with any questions and concerns regarding the above.    Barrier(s) to care: None. The patient is able to self care.    Mary Gonzalez PA-C  Medical Oncology/Hematology  [facility]    Subjective     Chief Complaint   Patient presents with    Consult     Referring provider    Cash Damon MD  [address] [place] [address]    History of present illness:   This is a 49-year-old male with past medical history of bipolar affective disorder, diabetes mellitus, hypertension, lactose intolerance, allergy who presents to the hematology clinic    11/25/1915: WBC 6.7, RBC 5.9, hemoglobin 14.0, MCV 76, platelet 251  11/9/2020 WBC 6.2, RBC 5.85, hemoglobin 14.3, MCV 80, RDW 16.0, platelet 249  2/2/22: WBC 6.08, RBC 5.77, hemoglobin 14.2, MCV 80, RDW 16.3, platelet 250  2/18/2023 WBC 6.27, RBC 6.0, hemoglobin 14.5, MCV 79, platelet 268   5/11/2023:  colonoscopy- benign, follow up in 5 years.  1/9/2024 WBC 6.5, RBC 5.8, hemoglobin 14.4, MCV 79, platelet 250   PSA 0.90  4/10/2024 CBC 6.04, hemoglobin 14.9, MCV 81, RDW 15.4, platelet count 274      06/21/24: feeling well. No issues.   No biological children  + family history - Mother, MGM    Review of Systems   Constitutional:  Negative for activity change, appetite change, fatigue and fever.   HENT:  Negative for nosebleeds.    Respiratory:  Negative for cough, choking and shortness of breath.    Cardiovascular:  Negative for chest pain, palpitations and leg swelling.   Gastrointestinal:  Negative for abdominal distention, abdominal pain, anal bleeding, blood in stool, constipation, diarrhea, nausea and vomiting.   Endocrine: Negative for cold intolerance.   Genitourinary:  Negative for hematuria.   Musculoskeletal:  Negative for myalgias.   Skin:  Negative for color change, pallor and rash.   Allergic/Immunologic: Negative for  immunocompromised state.   Neurological:  Negative for headaches.   Hematological:  Negative for adenopathy. Does not bruise/bleed easily.   All other systems reviewed and are negative.      Past Medical History:   Diagnosis Date    Allergic     Anxiety     Bipolar affective disorder ([facility])     sees Dr. Joseph    Depression     Diabetes mellitus ([facility])     Lactose intolerance 2022    Obesity     Primary hypertension 2023    Type 2 diabetes mellitus treated without insulin ([facility]) 2020     No past surgical history on file.  Family History   Problem Relation Age of Onset    Hyperlipidemia Mother     Hypertension Father     Breast cancer Paternal Grandmother              Hypertension Paternal Grandmother      Social History     Socioeconomic History    Marital status: Single     Spouse name: None    Number of children: None    Years of education: None    Highest education level: None   Occupational History    Occupation: unemployed   Tobacco Use    Smoking status: Former     Current packs/day: 0.00     Average packs/day: 0.3 packs/day for 7.6 years (2.6 ttl pk-yrs)     Types: Cigarettes     Start date: 1992     Quit date:      Years since quittin.4     Passive exposure: Past    Smokeless tobacco: Never    Tobacco comments:     Smoked cigarettes for about 5 years as an adolescent   Vaping Use    Vaping status: Never Used   Substance and Sexual Activity    Alcohol use: Not Currently     Comment: Drank alcohol for about 5 years as an adolescent    Drug use: Not Currently     Types: Cocaine, Hashish, Ketamine, LSD, Marijuana, MDMA (ecstacy), Mescaline, Methamphetamines, Nitrous oxide, Opium, Oxycodone     Comment: clean since 3/1/1997    Sexual activity: Not Currently     Partners: Female, Male     Birth control/protection: Abstinence     Comment: Have been celibate since 3/1997   Other Topics Concern    None   Social History Narrative    From [place] fluent in both languages     Social  "Determinants of Health     Financial Resource Strain: Not on file   Food Insecurity: Not on file   Transportation Needs: Not on file   Physical Activity: Not on file   Stress: Not on file   Social Connections: Not on file   Intimate Partner Violence: Not on file   Housing Stability: Not on file         Current Outpatient Medications:     ARIPiprazole (ABILIFY) 5 mg tablet, Take 1 tablet by mouth daily , Disp: , Rfl:     Biotin 1000 MCG CHEW, Chew in the morning dissolvable, Disp: , Rfl:     cetirizine (ZyrTEC) 5 MG tablet, Take 5 mg by mouth daily  , Disp: , Rfl:     cholecalciferol (VITAMIN D3) 1,000 units tablet, Take 1,000 Units by mouth every other day, Disp: , Rfl:     escitalopram (LEXAPRO) 20 mg tablet, Take 20 mg by mouth daily, Disp: , Rfl:     fluticasone (FLONASE) 50 mcg/act nasal spray, 2 sprays into each nostril as needed , Disp: , Rfl:     glucose blood test strip, Test twice daily Dx: Diabetes E11.9  one touch ultra 2 test strips, Disp: 200 each, Rfl: 1    irbesartan (AVAPRO) 75 mg tablet, Take 1 tablet (75 mg total) by mouth daily, Disp: 90 tablet, Rfl: 1    Multiple Vitamins-Minerals (CENTRUM SILVER PO), Take by mouth, Disp: , Rfl:     omega-3-acid ethyl esters (LOVAZA) 1 g capsule, Take 1 capsule (1 g total) by mouth 2 (two) times a day, Disp: 60 capsule, Rfl: 5    rosuvastatin (CRESTOR) 10 MG tablet, TAKE 1 TABLET BY MOUTH EVERY DAY, Disp: 90 tablet, Rfl: 1    zolpidem (AMBIEN) 10 mg tablet, Take 10 mg by mouth daily at bedtime , Disp: , Rfl:   Allergies   Allergen Reactions    Seasonal Ic [Octacosanol] Allergic Rhinitis       Objective   /70 (BP Location: Left arm, Patient Position: Sitting, Cuff Size: Adult)   Pulse 64   Temp 97.6 °F (36.4 °C) (Temporal)   Resp 16   Ht 5' 11\" (1.803 m)   Wt 102 kg (225 lb)   SpO2 97%   BMI 31.38 kg/m²   Physical Exam  Constitutional:       General: He is not in acute distress.     Appearance: He is well-developed.   HENT:      Head: Normocephalic " and atraumatic.      Right Ear: External ear normal.      Left Ear: External ear normal.      Nose: Nose normal.   Eyes:      General: No scleral icterus.     Conjunctiva/sclera: Conjunctivae normal.   Cardiovascular:      Rate and Rhythm: Normal rate.   Pulmonary:      Effort: No respiratory distress.   Abdominal:      General: There is no distension.      Palpations: Abdomen is soft.   Skin:     Findings: No rash (on exposed skin.).   Neurological:      Mental Status: He is alert and oriented to person, place, and time.   Psychiatric:         Thought Content: Thought content normal.       Result Review  Labs:  Lab Results   Component Value Date    WBC 6.13 04/10/2024    HGB 14.9 04/10/2024    HCT 48.7 04/10/2024    MCV 81 (L) 04/10/2024     04/10/2024     Lab Results   Component Value Date    SODIUM 139 04/10/2024    K 4.3 04/10/2024     04/10/2024    CO2 28 04/10/2024    AGAP 9 04/10/2024    BUN 9 04/10/2024    CREATININE 0.92 04/10/2024    GLUC 101 03/16/2023    GLUF 142 (H) 04/10/2024    CALCIUM 9.0 04/10/2024    AST 22 04/10/2024    ALT 27 04/10/2024    ALKPHOS 68 04/10/2024    TP 6.6 04/10/2024    TBILI 0.72 04/10/2024    EGFR 98 04/10/2024       Imaging:     Please note:  This report has been generated by a voice recognition software system. Therefore there may be syntax, spelling, and/or grammatical errors. Please call if you have any questions.

## 2024-06-21 NOTE — PATIENT INSTRUCTIONS
[facility] Medical Oncology and Hematology Team  [place] - [phone]    Your Team Member:  Advanced Practitioner:  Mary Gonzalez PA-C    Please answer Private and Unavailable Calls - this may be your team(s) contacting you.  If you have medical questions/concerns/issues - contact us either by (1) My Chart (2) [place]

## 2024-06-24 ENCOUNTER — TELEPHONE (OUTPATIENT)
Age: 49
End: 2024-06-24

## 2024-06-24 NOTE — TELEPHONE ENCOUNTER
Pt called, received a letter from his insurance carrier, [employer], that his PCP is no longer covered and he will need to change PCP. Informed Pt there has been an error and the office will contact him to verify the the same.     Please advise. Thank you.

## 2024-07-01 DIAGNOSIS — R71.8 RBC MICROCYTOSIS: Primary | ICD-10-CM

## 2024-07-01 DIAGNOSIS — E61.1 IRON DEFICIENCY: ICD-10-CM

## 2024-07-03 NOTE — TELEPHONE ENCOUNTER
Patient calling in to let us know he did speak to insurance and they are asking us to call [phone] for providers to update the addresses for Dr. Damon. Any questions, please call to discuss. Thank you.

## 2024-07-03 NOTE — TELEPHONE ENCOUNTER
Spoke to patient, advised him to call his insurance to let them know he would like to switch back to our office. Patient stated he will call back with a reference number

## 2024-07-19 DIAGNOSIS — E78.2 MIXED HYPERLIPIDEMIA: ICD-10-CM

## 2024-07-19 RX ORDER — ROSUVASTATIN CALCIUM 10 MG/1
TABLET, COATED ORAL
Qty: 90 TABLET | Refills: 1 | Status: SHIPPED | OUTPATIENT
Start: 2024-07-19

## 2024-07-25 ENCOUNTER — PATIENT MESSAGE (OUTPATIENT)
Dept: INTERNAL MEDICINE CLINIC | Facility: OTHER | Age: 49
End: 2024-07-25

## 2024-10-10 ENCOUNTER — APPOINTMENT (OUTPATIENT)
Dept: LAB | Facility: CLINIC | Age: 49
End: 2024-10-10
Payer: COMMERCIAL

## 2024-10-10 DIAGNOSIS — E11.9 TYPE 2 DIABETES MELLITUS TREATED WITHOUT INSULIN (HCC): ICD-10-CM

## 2024-10-10 DIAGNOSIS — D56.3 THALASSEMIA TRAIT: ICD-10-CM

## 2024-10-10 LAB
ALBUMIN SERPL BCG-MCNC: 4.2 G/DL (ref 3.5–5)
ALP SERPL-CCNC: 72 U/L (ref 34–104)
ALT SERPL W P-5'-P-CCNC: 24 U/L (ref 7–52)
ANION GAP SERPL CALCULATED.3IONS-SCNC: 8 MMOL/L (ref 4–13)
AST SERPL W P-5'-P-CCNC: 21 U/L (ref 13–39)
BILIRUB SERPL-MCNC: 0.53 MG/DL (ref 0.2–1)
BUN SERPL-MCNC: 14 MG/DL (ref 5–25)
CALCIUM SERPL-MCNC: 8.8 MG/DL (ref 8.4–10.2)
CHLORIDE SERPL-SCNC: 100 MMOL/L (ref 96–108)
CO2 SERPL-SCNC: 28 MMOL/L (ref 21–32)
CREAT SERPL-MCNC: 0.92 MG/DL (ref 0.6–1.3)
ERYTHROCYTE [DISTWIDTH] IN BLOOD BY AUTOMATED COUNT: 14.9 % (ref 11.6–15.1)
EST. AVERAGE GLUCOSE BLD GHB EST-MCNC: 171 MG/DL
GFR SERPL CREATININE-BSD FRML MDRD: 98 ML/MIN/1.73SQ M
GLUCOSE P FAST SERPL-MCNC: 151 MG/DL (ref 65–99)
HBA1C MFR BLD: 7.6 %
HCT VFR BLD AUTO: 46.9 % (ref 36.5–49.3)
HGB BLD-MCNC: 14.6 G/DL (ref 12–17)
MCH RBC QN AUTO: 24.5 PG (ref 26.8–34.3)
MCHC RBC AUTO-ENTMCNC: 31.1 G/DL (ref 31.4–37.4)
MCV RBC AUTO: 79 FL (ref 82–98)
PLATELET # BLD AUTO: 241 THOUSANDS/UL (ref 149–390)
PMV BLD AUTO: 11.7 FL (ref 8.9–12.7)
POTASSIUM SERPL-SCNC: 4.3 MMOL/L (ref 3.5–5.3)
PROT SERPL-MCNC: 6.6 G/DL (ref 6.4–8.4)
RBC # BLD AUTO: 5.96 MILLION/UL (ref 3.88–5.62)
SODIUM SERPL-SCNC: 136 MMOL/L (ref 135–147)
WBC # BLD AUTO: 5.64 THOUSAND/UL (ref 4.31–10.16)

## 2024-10-10 PROCEDURE — 80053 COMPREHEN METABOLIC PANEL: CPT

## 2024-10-10 PROCEDURE — 85027 COMPLETE CBC AUTOMATED: CPT

## 2024-10-10 PROCEDURE — 36415 COLL VENOUS BLD VENIPUNCTURE: CPT

## 2024-10-10 PROCEDURE — 83036 HEMOGLOBIN GLYCOSYLATED A1C: CPT

## 2024-10-15 ENCOUNTER — OFFICE VISIT (OUTPATIENT)
Dept: INTERNAL MEDICINE CLINIC | Facility: OTHER | Age: 49
End: 2024-10-15
Payer: COMMERCIAL

## 2024-10-15 VITALS
HEART RATE: 71 BPM | HEIGHT: 71 IN | DIASTOLIC BLOOD PRESSURE: 82 MMHG | WEIGHT: 229.4 LBS | SYSTOLIC BLOOD PRESSURE: 130 MMHG | BODY MASS INDEX: 32.11 KG/M2 | TEMPERATURE: 98.1 F | RESPIRATION RATE: 20 BRPM | OXYGEN SATURATION: 95 %

## 2024-10-15 DIAGNOSIS — F32.89 OTHER DEPRESSION: ICD-10-CM

## 2024-10-15 DIAGNOSIS — E11.9 TYPE 2 DIABETES MELLITUS TREATED WITHOUT INSULIN (HCC): Primary | ICD-10-CM

## 2024-10-15 DIAGNOSIS — E78.2 MIXED HYPERLIPIDEMIA: ICD-10-CM

## 2024-10-15 DIAGNOSIS — Z12.5 PROSTATE CANCER SCREENING: ICD-10-CM

## 2024-10-15 DIAGNOSIS — I10 PRIMARY HYPERTENSION: ICD-10-CM

## 2024-10-15 DIAGNOSIS — D56.3 THALASSEMIA TRAIT: ICD-10-CM

## 2024-10-15 PROCEDURE — 99214 OFFICE O/P EST MOD 30 MIN: CPT | Performed by: INTERNAL MEDICINE

## 2024-10-15 RX ORDER — DAPAGLIFLOZIN 10 MG/1
10 TABLET, FILM COATED ORAL DAILY
Qty: 30 TABLET | Refills: 5 | Status: SHIPPED | OUTPATIENT
Start: 2024-10-15

## 2024-10-15 NOTE — PROGRESS NOTES
Assessment/Plan:    Primary hypertension  Controlled, continue irbesartan 75 mg daily.    Type 2 diabetes mellitus treated without insulin (HCC)  Will start Farxiga 10 mg daily.  Lab Results   Component Value Date    HGBA1C 7.6 (H) 10/10/2024       Depression  Stable, continue follow up with psychiatry.  Continue current regimen.    Thalassemia trait  Continue to trend CBC.    Mixed hyperlipidemia  Continue omega-3 and rosuvastatin.       Diagnoses and all orders for this visit:    Type 2 diabetes mellitus treated without insulin (HCC)  -     dapagliflozin (Farxiga) 10 MG tablet; Take 1 tablet (10 mg total) by mouth daily  -     CBC; Future  -     Comprehensive metabolic panel; Future  -     Hemoglobin A1C; Future  -     Lipid panel; Future  -     Albumin / creatinine urine ratio; Future    Primary hypertension  -     CBC; Future  -     Comprehensive metabolic panel; Future  -     Hemoglobin A1C; Future  -     Lipid panel; Future  -     Albumin / creatinine urine ratio; Future    Other depression    Thalassemia trait  -     CBC; Future  -     Comprehensive metabolic panel; Future    Mixed hyperlipidemia  -     CBC; Future  -     Comprehensive metabolic panel; Future  -     Lipid panel; Future    Prostate cancer screening  -     PSA, Total Screen; Future                  Subjective:      Patient ID: Telly Martinez is a 49 y.o. male.    Chief Complaint   Patient presents with    Follow-up     6 month - labs done 10/10/24 -          Annual, foot exam    Obesity     Looking to try something for weight loss       Telly Martinez is seen today for follow up of chronic conditions.   Recent laboratory studies reviewed today with the patient. A1c increased to 7.6%.   he has been compliant with his medication regimen.   He was not able to tolerate Rybelsus due to side effects.   he has no complaints or concerns at this time.       Diabetes  He presents for his follow-up diabetic visit. He has type 2 diabetes mellitus. His disease  course has been worsening. There are no hypoglycemic associated symptoms. Pertinent negatives for hypoglycemia include no dizziness or headaches. There are no diabetic associated symptoms. Pertinent negatives for diabetes include no chest pain, no fatigue and no weakness. There are no hypoglycemic complications. Symptoms are stable. There are no diabetic complications. When asked about current treatments, none were reported. An ACE inhibitor/angiotensin II receptor blocker is being taken.   Hyperlipidemia  This is a chronic problem. The current episode started more than 1 year ago. The problem is controlled. Recent lipid tests were reviewed and are normal. Pertinent negatives include no chest pain or shortness of breath. Current antihyperlipidemic treatment includes statins. The current treatment provides moderate improvement of lipids. There are no compliance problems.    Hypertension  This is a chronic problem. The current episode started more than 1 year ago. The problem is unchanged. The problem is controlled. Pertinent negatives include no chest pain, headaches, palpitations or shortness of breath. Past treatments include angiotensin blockers. The current treatment provides moderate improvement. There are no compliance problems.        The following portions of the patient's history were reviewed and updated as appropriate: allergies, current medications, past family history, past medical history, past social history, past surgical history, and problem list.    Review of Systems   Constitutional:  Negative for activity change, appetite change, chills, diaphoresis, fatigue and fever.   HENT:  Negative for congestion, postnasal drip, rhinorrhea, sinus pressure, sinus pain, sneezing and sore throat.    Eyes:  Negative for visual disturbance.   Respiratory:  Negative for apnea, cough, choking, chest tightness, shortness of breath and wheezing.    Cardiovascular:  Negative for chest pain, palpitations and leg  swelling.   Gastrointestinal:  Negative for abdominal distention, abdominal pain, anal bleeding, blood in stool, constipation, diarrhea, nausea and vomiting.   Endocrine: Negative for cold intolerance and heat intolerance.   Genitourinary:  Negative for difficulty urinating, dysuria and hematuria.   Musculoskeletal: Negative.    Skin: Negative.    Neurological:  Negative for dizziness, weakness, light-headedness, numbness and headaches.   Hematological:  Negative for adenopathy.   Psychiatric/Behavioral:  Negative for agitation, sleep disturbance and suicidal ideas.    All other systems reviewed and are negative.        Past Medical History:   Diagnosis Date    Allergic     Anxiety     Bipolar affective disorder ([facility])     sees Dr. Joseph    Depression     Diabetes mellitus ([facility])     Lactose intolerance 8/4/2022    Obesity     Primary hypertension 11/20/2023    Type 2 diabetes mellitus treated without insulin ([facility]) 11/17/2020         Current Outpatient Medications:     ARIPiprazole (ABILIFY) 5 mg tablet, Take 1 tablet by mouth daily , Disp: , Rfl:     Biotin 1000 MCG CHEW, Chew in the morning dissolvable, Disp: , Rfl:     cetirizine (ZyrTEC) 5 MG tablet, Take 5 mg by mouth daily  , Disp: , Rfl:     cholecalciferol (VITAMIN D3) 1,000 units tablet, Take 1,000 Units by mouth every other day, Disp: , Rfl:     dapagliflozin (Farxiga) 10 MG tablet, Take 1 tablet (10 mg total) by mouth daily, Disp: 30 tablet, Rfl: 5    escitalopram (LEXAPRO) 20 mg tablet, Take 20 mg by mouth daily, Disp: , Rfl:     fluticasone (FLONASE) 50 mcg/act nasal spray, 2 sprays into each nostril as needed , Disp: , Rfl:     glucose blood test strip, Test twice daily Dx: Diabetes E11.9  one touch ultra 2 test strips, Disp: 200 each, Rfl: 1    irbesartan (AVAPRO) 75 mg tablet, Take 1 tablet (75 mg total) by mouth daily, Disp: 90 tablet, Rfl: 1    Multiple Vitamins-Minerals (CENTRUM SILVER PO), Take by mouth, Disp: , Rfl:     omega-3-acid ethyl esters  "(LOVAZA) 1 g capsule, Take 1 capsule (1 g total) by mouth 2 (two) times a day, Disp: 60 capsule, Rfl: 5    rosuvastatin (CRESTOR) 10 MG tablet, TAKE 1 TABLET BY MOUTH EVERY DAY, Disp: 90 tablet, Rfl: 1    zolpidem (AMBIEN) 10 mg tablet, Take 10 mg by mouth daily at bedtime , Disp: , Rfl:     Allergies   Allergen Reactions    Seasonal Ic [Octacosanol] Allergic Rhinitis    Semaglutide Abdominal Pain       Social History   History reviewed. No pertinent surgical history.  Family History   Problem Relation Age of Onset    Hyperlipidemia Mother     Hypertension Father     Breast cancer Paternal Grandmother              Hypertension Paternal Grandmother        Objective:  /82 (BP Location: Left arm, Patient Position: Sitting, Cuff Size: Large)   Pulse 71   Temp 98.1 °F (36.7 °C) (Temporal)   Resp 20   Ht 5' 11\" (1.803 m)   Wt 104 kg (229 lb 6.4 oz)   SpO2 95%   BMI 31.99 kg/m²     Recent Results (from the past 1344 hour(s))   CBC    Collection Time: 10/10/24  7:58 AM   Result Value Ref Range    WBC 5.64 4.31 - 10.16 Thousand/uL    RBC 5.96 (H) 3.88 - 5.62 Million/uL    Hemoglobin 14.6 12.0 - 17.0 g/dL    Hematocrit 46.9 36.5 - 49.3 %    MCV 79 (L) 82 - 98 fL    MCH 24.5 (L) 26.8 - 34.3 pg    MCHC 31.1 (L) 31.4 - 37.4 g/dL    RDW 14.9 11.6 - 15.1 %    Platelets 241 149 - 390 Thousands/uL    MPV 11.7 8.9 - 12.7 fL   Comprehensive metabolic panel    Collection Time: 10/10/24  7:58 AM   Result Value Ref Range    Sodium 136 135 - 147 mmol/L    Potassium 4.3 3.5 - 5.3 mmol/L    Chloride 100 96 - 108 mmol/L    CO2 28 21 - 32 mmol/L    ANION GAP 8 4 - 13 mmol/L    BUN 14 5 - 25 mg/dL    Creatinine 0.92 0.60 - 1.30 mg/dL    Glucose, Fasting 151 (H) 65 - 99 mg/dL    Calcium 8.8 8.4 - 10.2 mg/dL    AST 21 13 - 39 U/L    ALT 24 7 - 52 U/L    Alkaline Phosphatase 72 34 - 104 U/L    Total Protein 6.6 6.4 - 8.4 g/dL    Albumin 4.2 3.5 - 5.0 g/dL    Total Bilirubin 0.53 0.20 - 1.00 mg/dL    eGFR 98 ml/min/1.73sq " m   Hemoglobin A1C    Collection Time: 10/10/24  7:58 AM   Result Value Ref Range    Hemoglobin A1C 7.6 (H) Normal 4.0-5.6%; PreDiabetic 5.7-6.4%; Diabetic >=6.5%; Glycemic control for adults with diabetes <7.0% %     mg/dl            Physical Exam  Vitals and nursing note reviewed.   Constitutional:       General: He is not in acute distress.     Appearance: He is well-developed. He is not diaphoretic.   HENT:      Head: Normocephalic and atraumatic.   Eyes:      General: No scleral icterus.        Right eye: No discharge.         Left eye: No discharge.      Conjunctiva/sclera: Conjunctivae normal.      Pupils: Pupils are equal, round, and reactive to light.   Neck:      Thyroid: No thyromegaly.      Vascular: No JVD.   Cardiovascular:      Rate and Rhythm: Normal rate and regular rhythm.      Pulses: no weak pulses.           Dorsalis pedis pulses are 2+ on the right side and 2+ on the left side.        Posterior tibial pulses are 2+ on the right side and 2+ on the left side.      Heart sounds: Normal heart sounds. No murmur heard.     No friction rub. No gallop.   Pulmonary:      Effort: Pulmonary effort is normal. No respiratory distress.      Breath sounds: Normal breath sounds. No wheezing or rales.   Chest:      Chest wall: No tenderness.   Abdominal:      General: Bowel sounds are normal. There is no distension.      Palpations: Abdomen is soft. There is no mass.      Tenderness: There is no abdominal tenderness. There is no guarding or rebound.   Musculoskeletal:         General: No tenderness or deformity. Normal range of motion.      Cervical back: Normal range of motion and neck supple.   Feet:      Right foot:      Skin integrity: Callus present. No ulcer, skin breakdown, erythema, warmth or dry skin.      Left foot:      Skin integrity: Callus present. No ulcer, skin breakdown, erythema, warmth or dry skin.   Lymphadenopathy:      Cervical: No cervical adenopathy.   Skin:     General: Skin is  warm and dry.      Coloration: Skin is not pale.      Findings: No erythema or rash.   Neurological:      Mental Status: He is alert and oriented to person, place, and time.      Cranial Nerves: No cranial nerve deficit.      Coordination: Coordination normal.      Deep Tendon Reflexes: Reflexes are normal and symmetric.   Psychiatric:         Behavior: Behavior normal.         Thought Content: Thought content normal.         Judgment: Judgment normal.         Diabetic Foot Exam    Patient's shoes and socks removed.    Right Foot/Ankle   Right Foot Inspection  Skin Exam: skin normal, skin intact, callus and callus. No dry skin, no warmth, no erythema, no maceration, no abnormal color, no pre-ulcer and no ulcer.     Toe Exam: ROM and strength within normal limits.     Sensory   Monofilament testing: intact    Vascular  Capillary refills: < 3 seconds  The right DP pulse is 2+. The right PT pulse is 2+.     Left Foot/Ankle  Left Foot Inspection  Skin Exam: skin normal, skin intact and callus. No dry skin, no warmth, no erythema, no maceration, normal color, no pre-ulcer and no ulcer.     Toe Exam: ROM and strength within normal limits.     Sensory   Monofilament testing: intact    Vascular  Capillary refills: < 3 seconds  The left DP pulse is 2+. The left PT pulse is 2+.     Assign Risk Category  No deformity present  No loss of protective sensation  No weak pulses  Risk: 0

## 2024-10-15 NOTE — ASSESSMENT & PLAN NOTE
Will start Farxiga 10 mg daily.  Lab Results   Component Value Date    HGBA1C 7.6 (H) 10/10/2024

## 2024-10-21 ENCOUNTER — TELEPHONE (OUTPATIENT)
Age: 49
End: 2024-10-21

## 2024-10-21 NOTE — TELEPHONE ENCOUNTER
PA for FARXIGA 10MG NOT REQUIRED     Reason (screenshot if applicable)          Patient advised by          [x] MyChart Message  [] Phone call   []LMOM  []L/M to call office as no active Communication consent on file  []Unable to leave detailed message as VM not approved on Communication consent       Pharmacy advised by    [x]Fax  []Phone call

## 2024-10-21 NOTE — TELEPHONE ENCOUNTER
Reason for call:   [x] Prior Auth  [] Other:     Caller:  [x] Patient  [] Pharmacy  Name:   Address:   Callback Number:     Medication: dapagliflozin (Farxiga) 10 MG tablet    Dose/Frequency: Sig: Take 1 tablet (10 mg total) by mouth daily    Quantity: 30    Ordering Provider:   [x] PCP/Provider -   [] Speciality/Provider -

## 2024-10-21 NOTE — TELEPHONE ENCOUNTER
PA for dapagliflozin (Farxiga) 10 MG SUBMITTED     via    [x]CMM-KEY: [id number]  []Surescripts-Case ID #   []Availity-Auth ID # NDC #   []Faxed to plan   []Other website   []Phone call Case ID #     Office notes sent, clinical questions answered. Awaiting determination    Turnaround time for your insurance to make a decision on your Prior Authorization can take 7-21 business days.

## 2024-12-02 ENCOUNTER — NURSE TRIAGE (OUTPATIENT)
Age: 49
End: 2024-12-02

## 2024-12-02 NOTE — TELEPHONE ENCOUNTER
"Patient called in to report his BP has been elevated. This am prior to having coffee and eating is 127/97 HR 69. He is asymptomatic. He is on Irbesartan and new medication Farxiga.  He reports having lost some weight. He will monitor his BP daily and send in log via My chart. BP parameters reviewed with patient. OV 12/19/24 with PCP    Reason for Disposition   Systolic BP >= 130 OR Diastolic >= 80, and history of heart problems, kidney disease, or diabetes mellitus    Answer Assessment - Initial Assessment Questions  1. BLOOD PRESSURE: \"What is your blood pressure?\" \"Did you take at least two measurements 5 minutes apart?\"      127/97 HR 69- unable to provide other readings...states diastolic generally in 80s  2. ONSET: \"When did you take your blood pressure?\"      First thing in am before coffee  3. HOW: \"How did you take your blood pressure?\" (e.g., automatic home BP monitor, visiting nurse)      Upper arm cuff  4. HISTORY: \"Do you have a history of high blood pressure?\"      yes  5. MEDICINES: \"Are you taking any medicines for blood pressure?\" \"Have you missed any doses recently?\"      Irbesartan   6. OTHER SYMPTOMS: \"Do you have any symptoms?\" (e.g., blurred vision, chest pain, difficulty breathing, headache, weakness)      no    Protocols used: Blood Pressure - High-Adult-OH    "

## 2024-12-06 ENCOUNTER — PATIENT MESSAGE (OUTPATIENT)
Dept: INTERNAL MEDICINE CLINIC | Facility: OTHER | Age: 49
End: 2024-12-06

## 2024-12-09 VITALS — SYSTOLIC BLOOD PRESSURE: 128 MMHG | DIASTOLIC BLOOD PRESSURE: 89 MMHG

## 2024-12-25 DIAGNOSIS — I10 PRIMARY HYPERTENSION: ICD-10-CM

## 2024-12-25 RX ORDER — IRBESARTAN 75 MG/1
75 TABLET ORAL DAILY
Qty: 90 TABLET | Refills: 1 | Status: SHIPPED | OUTPATIENT
Start: 2024-12-25

## 2025-01-07 DIAGNOSIS — E78.2 MIXED HYPERLIPIDEMIA: ICD-10-CM

## 2025-01-08 RX ORDER — ROSUVASTATIN CALCIUM 10 MG/1
10 TABLET, COATED ORAL DAILY
Qty: 100 TABLET | Refills: 1 | Status: SHIPPED | OUTPATIENT
Start: 2025-01-08

## 2025-04-07 DIAGNOSIS — E78.2 MIXED HYPERLIPIDEMIA: ICD-10-CM

## 2025-04-08 RX ORDER — ROSUVASTATIN CALCIUM 10 MG/1
10 TABLET, COATED ORAL DAILY
Qty: 90 TABLET | Refills: 1 | Status: SHIPPED | OUTPATIENT
Start: 2025-04-08

## 2025-04-21 ENCOUNTER — APPOINTMENT (OUTPATIENT)
Dept: LAB | Facility: CLINIC | Age: 50
End: 2025-04-21
Payer: COMMERCIAL

## 2025-04-21 ENCOUNTER — RESULTS FOLLOW-UP (OUTPATIENT)
Dept: HEMATOLOGY ONCOLOGY | Facility: CLINIC | Age: 50
End: 2025-04-21

## 2025-04-21 DIAGNOSIS — R71.8 RBC MICROCYTOSIS: ICD-10-CM

## 2025-04-21 DIAGNOSIS — E78.2 MIXED HYPERLIPIDEMIA: ICD-10-CM

## 2025-04-21 DIAGNOSIS — E11.9 TYPE 2 DIABETES MELLITUS TREATED WITHOUT INSULIN (HCC): ICD-10-CM

## 2025-04-21 DIAGNOSIS — Z12.5 PROSTATE CANCER SCREENING: ICD-10-CM

## 2025-04-21 DIAGNOSIS — D56.3 THALASSEMIA TRAIT: ICD-10-CM

## 2025-04-21 DIAGNOSIS — E61.1 IRON DEFICIENCY: ICD-10-CM

## 2025-04-21 DIAGNOSIS — I10 PRIMARY HYPERTENSION: ICD-10-CM

## 2025-04-21 DIAGNOSIS — E61.1 IRON DEFICIENCY: Primary | ICD-10-CM

## 2025-04-21 LAB
ALBUMIN SERPL BCG-MCNC: 4.2 G/DL (ref 3.5–5)
ALP SERPL-CCNC: 67 U/L (ref 34–104)
ALT SERPL W P-5'-P-CCNC: 24 U/L (ref 7–52)
ANION GAP SERPL CALCULATED.3IONS-SCNC: 10 MMOL/L (ref 4–13)
AST SERPL W P-5'-P-CCNC: 19 U/L (ref 13–39)
BASOPHILS # BLD AUTO: 0.07 THOUSANDS/ÂΜL (ref 0–0.1)
BASOPHILS NFR BLD AUTO: 1 % (ref 0–1)
BILIRUB SERPL-MCNC: 0.65 MG/DL (ref 0.2–1)
BUN SERPL-MCNC: 13 MG/DL (ref 5–25)
CALCIUM SERPL-MCNC: 9.2 MG/DL (ref 8.4–10.2)
CHLORIDE SERPL-SCNC: 101 MMOL/L (ref 96–108)
CHOLEST SERPL-MCNC: 190 MG/DL (ref ?–200)
CO2 SERPL-SCNC: 29 MMOL/L (ref 21–32)
CREAT SERPL-MCNC: 0.95 MG/DL (ref 0.6–1.3)
CREAT UR-MCNC: 87.5 MG/DL
EOSINOPHIL # BLD AUTO: 0.38 THOUSAND/ÂΜL (ref 0–0.61)
EOSINOPHIL NFR BLD AUTO: 6 % (ref 0–6)
ERYTHROCYTE [DISTWIDTH] IN BLOOD BY AUTOMATED COUNT: 17.3 % (ref 11.6–15.1)
EST. AVERAGE GLUCOSE BLD GHB EST-MCNC: 157 MG/DL
FERRITIN SERPL-MCNC: 19 NG/ML (ref 30–336)
GFR SERPL CREATININE-BSD FRML MDRD: 93 ML/MIN/1.73SQ M
GLUCOSE P FAST SERPL-MCNC: 103 MG/DL (ref 65–99)
HBA1C MFR BLD: 7.1 %
HCT VFR BLD AUTO: 52.3 % (ref 36.5–49.3)
HDLC SERPL-MCNC: 50 MG/DL
HGB BLD-MCNC: 15.7 G/DL (ref 12–17)
IMM GRANULOCYTES # BLD AUTO: 0.01 THOUSAND/UL (ref 0–0.2)
IMM GRANULOCYTES NFR BLD AUTO: 0 % (ref 0–2)
IRON SATN MFR SERPL: 22 % (ref 15–50)
IRON SERPL-MCNC: 86 UG/DL (ref 50–212)
LDLC SERPL CALC-MCNC: 106 MG/DL (ref 0–100)
LYMPHOCYTES # BLD AUTO: 2.6 THOUSANDS/ÂΜL (ref 0.6–4.47)
LYMPHOCYTES NFR BLD AUTO: 39 % (ref 14–44)
MCH RBC QN AUTO: 23.8 PG (ref 26.8–34.3)
MCHC RBC AUTO-ENTMCNC: 30 G/DL (ref 31.4–37.4)
MCV RBC AUTO: 79 FL (ref 82–98)
MICROALBUMIN UR-MCNC: <7 MG/L
MONOCYTES # BLD AUTO: 0.53 THOUSAND/ÂΜL (ref 0.17–1.22)
MONOCYTES NFR BLD AUTO: 8 % (ref 4–12)
NEUTROPHILS # BLD AUTO: 3.04 THOUSANDS/ÂΜL (ref 1.85–7.62)
NEUTS SEG NFR BLD AUTO: 46 % (ref 43–75)
NONHDLC SERPL-MCNC: 140 MG/DL
NRBC BLD AUTO-RTO: 0 /100 WBCS
PLATELET # BLD AUTO: 247 THOUSANDS/UL (ref 149–390)
PMV BLD AUTO: 11.7 FL (ref 8.9–12.7)
POTASSIUM SERPL-SCNC: 4 MMOL/L (ref 3.5–5.3)
PROT SERPL-MCNC: 7 G/DL (ref 6.4–8.4)
PSA SERPL-MCNC: 0.64 NG/ML (ref 0–4)
RBC # BLD AUTO: 6.61 MILLION/UL (ref 3.88–5.62)
SODIUM SERPL-SCNC: 140 MMOL/L (ref 135–147)
TIBC SERPL-MCNC: 386.4 UG/DL (ref 250–450)
TRANSFERRIN SERPL-MCNC: 276 MG/DL (ref 203–362)
TRIGL SERPL-MCNC: 168 MG/DL (ref ?–150)
UIBC SERPL-MCNC: 300 UG/DL (ref 155–355)
WBC # BLD AUTO: 6.63 THOUSAND/UL (ref 4.31–10.16)

## 2025-04-21 PROCEDURE — 82728 ASSAY OF FERRITIN: CPT

## 2025-04-21 PROCEDURE — 36415 COLL VENOUS BLD VENIPUNCTURE: CPT

## 2025-04-21 PROCEDURE — 85025 COMPLETE CBC W/AUTO DIFF WBC: CPT

## 2025-04-21 PROCEDURE — 83550 IRON BINDING TEST: CPT

## 2025-04-21 PROCEDURE — 80053 COMPREHEN METABOLIC PANEL: CPT

## 2025-04-21 PROCEDURE — 82043 UR ALBUMIN QUANTITATIVE: CPT

## 2025-04-21 PROCEDURE — G0103 PSA SCREENING: HCPCS

## 2025-04-21 PROCEDURE — 80061 LIPID PANEL: CPT

## 2025-04-21 PROCEDURE — 82570 ASSAY OF URINE CREATININE: CPT

## 2025-04-21 PROCEDURE — 83540 ASSAY OF IRON: CPT

## 2025-04-21 PROCEDURE — 83036 HEMOGLOBIN GLYCOSYLATED A1C: CPT

## 2025-04-24 ENCOUNTER — RA CDI HCC (OUTPATIENT)
Dept: OTHER | Facility: HOSPITAL | Age: 50
End: 2025-04-24

## 2025-04-24 ENCOUNTER — OFFICE VISIT (OUTPATIENT)
Dept: INTERNAL MEDICINE CLINIC | Facility: OTHER | Age: 50
End: 2025-04-24
Payer: COMMERCIAL

## 2025-04-24 VITALS
SYSTOLIC BLOOD PRESSURE: 126 MMHG | OXYGEN SATURATION: 95 % | HEART RATE: 56 BPM | DIASTOLIC BLOOD PRESSURE: 80 MMHG | WEIGHT: 244.8 LBS | HEIGHT: 71 IN | BODY MASS INDEX: 34.27 KG/M2 | RESPIRATION RATE: 20 BRPM | TEMPERATURE: 98 F

## 2025-04-24 DIAGNOSIS — Z00.00 ANNUAL PHYSICAL EXAM: ICD-10-CM

## 2025-04-24 DIAGNOSIS — E66.01 SEVERE OBESITY (BMI 35.0-39.9) WITH COMORBIDITY (HCC): ICD-10-CM

## 2025-04-24 DIAGNOSIS — E11.9 TYPE 2 DIABETES MELLITUS TREATED WITHOUT INSULIN (HCC): ICD-10-CM

## 2025-04-24 DIAGNOSIS — F32.89 OTHER DEPRESSION: ICD-10-CM

## 2025-04-24 DIAGNOSIS — D56.3 THALASSEMIA TRAIT: ICD-10-CM

## 2025-04-24 DIAGNOSIS — I10 PRIMARY HYPERTENSION: ICD-10-CM

## 2025-04-24 DIAGNOSIS — E78.2 MIXED HYPERLIPIDEMIA: ICD-10-CM

## 2025-04-24 DIAGNOSIS — E11.9 TYPE 2 DIABETES MELLITUS TREATED WITHOUT INSULIN (HCC): Primary | ICD-10-CM

## 2025-04-24 DIAGNOSIS — F31.31 BIPOLAR AFFECTIVE DISORDER, CURRENTLY DEPRESSED, MILD (HCC): ICD-10-CM

## 2025-04-24 DIAGNOSIS — Z11.59 NEED FOR HEPATITIS C SCREENING TEST: ICD-10-CM

## 2025-04-24 PROCEDURE — 99396 PREV VISIT EST AGE 40-64: CPT | Performed by: INTERNAL MEDICINE

## 2025-04-24 PROCEDURE — 99214 OFFICE O/P EST MOD 30 MIN: CPT | Performed by: INTERNAL MEDICINE

## 2025-04-24 RX ORDER — DAPAGLIFLOZIN 10 MG/1
10 TABLET, FILM COATED ORAL DAILY
Qty: 90 TABLET | Refills: 1 | Status: SHIPPED | OUTPATIENT
Start: 2025-04-24 | End: 2025-04-24 | Stop reason: SDUPTHER

## 2025-04-24 RX ORDER — DAPAGLIFLOZIN 10 MG/1
10 TABLET, FILM COATED ORAL DAILY
Qty: 90 TABLET | Refills: 1 | Status: SHIPPED | OUTPATIENT
Start: 2025-04-24

## 2025-04-24 RX ORDER — IRBESARTAN 75 MG/1
75 TABLET ORAL DAILY
Qty: 90 TABLET | Refills: 1 | Status: SHIPPED | OUTPATIENT
Start: 2025-04-24

## 2025-04-24 NOTE — TELEPHONE ENCOUNTER
Patient called in and states his insurance will only pay for brand name Farxiga. Please send in the brand name.

## 2025-04-24 NOTE — PROGRESS NOTES
HCC coding opportunities          Chart Reviewed number of suggestions sent to Provider: 2   F31.9 - F31.31 Found on active problem list - Please assess and document with MEAT as appropriate.     K51.40 - Refer to colonoscopy 5/11/23     M20.41 - No evidence in chart of dx.       [employer] AUDIT    Patients Insurance        Commercial Insurance: [employer]

## 2025-04-24 NOTE — ASSESSMENT & PLAN NOTE
Controlled, continue irbesartan 75 mg daily.  Orders:  •  irbesartan (AVAPRO) 75 mg tablet; Take 1 tablet (75 mg total) by mouth daily

## 2025-04-24 NOTE — PROGRESS NOTES
Adult Annual Physical  Name: Telly Martinez      : 1975      MRN: [id number]  Encounter Provider: Cash Damon MD  Encounter Date: 2025   Encounter department: [facility] [place]    :  Assessment & Plan  Primary hypertension  Controlled, continue irbesartan 75 mg daily.  Orders:  •  irbesartan (AVAPRO) 75 mg tablet; Take 1 tablet (75 mg total) by mouth daily    Type 2 diabetes mellitus treated without insulin (HCC)  Continue Farxiga 10 mg daily.  Lab Results   Component Value Date    HGBA1C 7.1 (H) 2025     Orders:  •  dapagliflozin (Farxiga) 10 MG tablet; Take 1 tablet (10 mg total) by mouth daily  •  Hemoglobin A1C; Future  •  Comprehensive metabolic panel; Future    Annual physical exam  He is up to date on immunizations.   Discussed skin cancer screening.   Discussed diet and exercise.        Other depression  Stable, continue follow up with psychiatry.  Continue current regimen.       Bipolar affective disorder, currently depressed, mild (HCC)  Continue current regimen and f/u with psychiatry.        Thalassemia trait  Continue to trend CBC. Continue follow up with hematology.        Mixed hyperlipidemia  Continue omega-3 and rosuvastatin.       Severe obesity (BMI 35.0-39.9) with comorbidity (HCC)  Discussed diet and exercise.        Need for hepatitis C screening test    Orders:  •  Hepatitis C Antibody; Future        Preventive Screenings:  - Diabetes Screening: screening not indicated, has diabetes, risks/benefits discussed, screening up-to-date and orders placed  - Cholesterol Screening: screening not indicated, has hyperlipidemia, risks/benefits discussed and screening up-to-date   - Hepatitis C screening: risks/benefits discussed and orders placed   - HIV screening: screening not indicated   - Colon cancer screening: screening up-to-date   - Lung cancer screening: screening not indicated   - Prostate cancer screening: screening up-to-date and  risks/benefits discussed     Immunizations:  - Immunizations due: Hepatitis A  - Risks/benefits immunizations discussed      Counseling/Anticipatory Guidance:  - Alcohol: discussed moderation in alcohol intake and recommendations for healthy alcohol use.   - Tobacco use: discussed harms of tobacco use and management options for quitting.   - Dental health: discussed importance of regular tooth brushing, flossing, and dental visits.   - Sexual health: discussed sexually transmitted diseases, partner selection, use of condoms, avoidance of unintended pregnancy, and contraceptive alternatives.   - Diet: discussed recommendations for a healthy/well-balanced diet.   - Exercise: the importance of regular exercise/physical activity was discussed. Recommend exercise 3-5 times per week for at least 30 minutes.   - Injury prevention: discussed safety/seat belts, safety helmets, smoke detectors, carbon monoxide detectors, and smoking near bedding or upholstery.          History of Present Illness     Adult Annual Physical:  Patient presents for annual physical. Telly Martinez is seen today for follow up of chronic conditions.   Recent laboratory studies reviewed today with the patient. A1c improved to 7.1%.   he has been compliant with his medication regimen.   He has been in contact with his hematologist and plan is to increase dietary iron and monitor levels prior to starting an iron supplement.   he has no complaints or concerns at this time.   .     Diet and Physical Activity:  - Diet/Nutrition: low carb diet. high protein  - Exercise: 30-60 minutes on average, moderate cardiovascular exercise, 3-4 times a week on average and walking.    Depression Screening:    - PHQ-9 Score: 1    General Health:  - Sleep: 7-8 hours of sleep on average and sleeps well. with prescribed drug  - Hearing: normal hearing bilateral ears.  - Vision: vision problems and most recent eye exam < 1 year ago.  - Dental: regular dental visits and brushes  "teeth twice daily. floss 1-2 times weekly     Health:    - Urinary symptoms: none.     Advanced Care Planning:  - Has an advanced directive?: no    - Has a durable medical POA?: no      Review of Systems   Constitutional:  Negative for activity change, appetite change, chills, diaphoresis, fatigue and fever.   HENT:  Negative for congestion, postnasal drip, rhinorrhea, sinus pressure, sinus pain, sneezing and sore throat.    Eyes:  Negative for visual disturbance.   Respiratory:  Negative for apnea, cough, choking, chest tightness, shortness of breath and wheezing.    Cardiovascular:  Negative for chest pain, palpitations and leg swelling.   Gastrointestinal:  Negative for abdominal distention, abdominal pain, anal bleeding, blood in stool, constipation, diarrhea, nausea and vomiting.   Endocrine: Negative for cold intolerance and heat intolerance.   Genitourinary:  Negative for difficulty urinating, dysuria and hematuria.   Musculoskeletal: Negative.    Skin: Negative.    Neurological:  Negative for dizziness, weakness, light-headedness, numbness and headaches.   Hematological:  Negative for adenopathy.   Psychiatric/Behavioral:  Negative for agitation, sleep disturbance and suicidal ideas.    All other systems reviewed and are negative.        Objective   /80 (BP Location: Left arm, Patient Position: Sitting, Cuff Size: Large)   Pulse 56   Temp 98 °F (36.7 °C) (Temporal)   Resp 20   Ht 5' 11\" (1.803 m)   Wt 111 kg (244 lb 12.8 oz)   SpO2 95%   BMI 34.14 kg/m²     Physical Exam  Vitals and nursing note reviewed.   Constitutional:       General: He is not in acute distress.     Appearance: He is well-developed. He is not diaphoretic.   HENT:      Head: Normocephalic and atraumatic.   Eyes:      General: No scleral icterus.        Right eye: No discharge.         Left eye: No discharge.      Conjunctiva/sclera: Conjunctivae normal.      Pupils: Pupils are equal, round, and reactive to light.   Neck:    "   Thyroid: No thyromegaly.      Vascular: No JVD.   Cardiovascular:      Rate and Rhythm: Normal rate and regular rhythm.      Heart sounds: Normal heart sounds. No murmur heard.     No friction rub. No gallop.   Pulmonary:      Effort: Pulmonary effort is normal. No respiratory distress.      Breath sounds: Normal breath sounds. No wheezing or rales.   Chest:      Chest wall: No tenderness.   Abdominal:      General: Bowel sounds are normal. There is no distension.      Palpations: Abdomen is soft. There is no mass.      Tenderness: There is no abdominal tenderness. There is no guarding or rebound.   Musculoskeletal:         General: No tenderness or deformity. Normal range of motion.      Cervical back: Normal range of motion and neck supple.   Lymphadenopathy:      Cervical: No cervical adenopathy.   Skin:     General: Skin is warm and dry.      Coloration: Skin is not pale.      Findings: No erythema or rash.   Neurological:      Mental Status: He is alert and oriented to person, place, and time.      Cranial Nerves: No cranial nerve deficit.      Coordination: Coordination normal.      Deep Tendon Reflexes: Reflexes are normal and symmetric.   Psychiatric:         Behavior: Behavior normal.         Thought Content: Thought content normal.         Judgment: Judgment normal.

## 2025-04-24 NOTE — ASSESSMENT & PLAN NOTE
Continue Farxiga 10 mg daily.  Lab Results   Component Value Date    HGBA1C 7.1 (H) 04/21/2025     Orders:  •  dapagliflozin (Farxiga) 10 MG tablet; Take 1 tablet (10 mg total) by mouth daily  •  Hemoglobin A1C; Future  •  Comprehensive metabolic panel; Future

## 2025-04-24 NOTE — PATIENT INSTRUCTIONS
"Patient Education     Routine physical for adults   The Basics   Written by the doctors and editors at [facility]   What is a physical? -- A physical is a routine visit, or \"check-up,\" with your doctor. You might also hear it called a \"wellness visit\" or \"preventive visit.\"  During each visit, the doctor will:   Ask about your physical and mental health   Ask about your habits, behaviors, and lifestyle   Do an exam   Give you vaccines if needed   Talk to you about any medicines you take   Give advice about your health   Answer your questions  Getting regular check-ups is an important part of taking care of your health. It can help your doctor find and treat any problems you have. But it's also important for preventing health problems.  A routine physical is different from a \"sick visit.\" A sick visit is when you see a doctor because of a health concern or problem. Since physicals are scheduled ahead of time, you can think about what you want to ask the doctor.  How often should I get a physical? -- It depends on your age and health. In general, for people age 21 years and older:   If you are younger than 50 years, you might be able to get a physical every 3 years.   If you are 50 years or older, your doctor might recommend a physical every year.  If you have an ongoing health condition, like diabetes or high blood pressure, your doctor will probably want to see you more often.  What happens during a physical? -- In general, each visit will include:   Physical exam - The doctor or nurse will check your height, weight, heart rate, and blood pressure. They will also look at your eyes and ears. They will ask about how you are feeling and whether you have any symptoms that bother you.   Medicines - It's a good idea to bring a list of all the medicines you take to each doctor visit. Your doctor will talk to you about your medicines and answer any questions. Tell them if you are having any side effects that bother you. You " "should also tell them if you are having trouble paying for any of your medicines.   Habits and behaviors - This includes:   Your diet   Your exercise habits   Whether you smoke, drink alcohol, or use drugs   Whether you are sexually active   Whether you feel safe at home  Your doctor will talk to you about things you can do to improve your health and lower your risk of health problems. They will also offer help and support. For example, if you want to quit smoking, they can give you advice and might prescribe medicines. If you want to improve your diet or get more physical activity, they can help you with this, too.   Lab tests, if needed - The tests you get will depend on your age and situation. For example, your doctor might want to check your:   Cholesterol   Blood sugar   Iron level   Vaccines - The recommended vaccines will depend on your age, health, and what vaccines you already had. Vaccines are very important because they can prevent certain serious or deadly infections.   Discussion of screening - \"Screening\" means checking for diseases or other health problems before they cause symptoms. Your doctor can recommend screening based on your age, risk, and preferences. This might include tests to check for:   Cancer, such as breast, prostate, cervical, ovarian, colorectal, prostate, lung, or skin cancer   Sexually transmitted infections, such as chlamydia and gonorrhea   Mental health conditions like depression and anxiety  Your doctor will talk to you about the different types of screening tests. They can help you decide which screenings to have. They can also explain what the results might mean.   Answering questions - The physical is a good time to ask the doctor or nurse questions about your health. If needed, they can refer you to other doctors or specialists, too.  Adults older than 65 years often need other care, too. As you get older, your doctor will talk to you about:   How to prevent falling at " home   Hearing or vision tests   Memory testing   How to take your medicines safely   Making sure that you have the help and support you need at home  All topics are updated as new evidence becomes available and our peer review process is complete.  This topic retrieved from [employer] on: May 02, 2024.  Topic 941269 Version 1.0  Release: 32.4.3 - C32.122  © 2024 UpToDate, Inc. and/or its affiliates. All rights reserved.  Consumer Information Use and Disclaimer   Disclaimer: This generalized information is a limited summary of diagnosis, treatment, and/or medication information. It is not meant to be comprehensive and should be used as a tool to help the user understand and/or assess potential diagnostic and treatment options. It does NOT include all information about conditions, treatments, medications, side effects, or risks that may apply to a specific patient. It is not intended to be medical advice or a substitute for the medical advice, diagnosis, or treatment of a health care provider based on the health care provider's examination and assessment of a patient's specific and unique circumstances. Patients must speak with a health care provider for complete information about their health, medical questions, and treatment options, including any risks or benefits regarding use of medications. This information does not endorse any treatments or medications as safe, effective, or approved for treating a specific patient. Up[employer]Date, Inc. and its affiliates disclaim any warranty or liability relating to this information or the use thereof.The use of this information is governed by the Terms of Use, available at https://www.woltersAidinuwer.com/en/know/clinical-effectiveness-terms. 2024© UpToDate, Inc. and its affiliates and/or licensors. All rights reserved.  Copyright   © 2024 Up[employer]Date, Inc. and/or its affiliates. All rights reserved.    Patient Education     Diet and health   The Basics   Written by the doctors and  "editors at [employer]   Why is it important to eat a healthy diet? -- It's important to eat a healthy diet because eating the right foods can keep you healthy now and later in life. It can lower the risk of problems like heart disease, diabetes, high blood pressure, and some types of cancer. It can also help you live longer and improve your quality of life.  What kind of diet is best? -- There is no 1 specific diet that experts recommend for everyone. People choose what foods to eat for many different reasons. These include personal preference, culture, [employer], allergies or intolerances, and nutritional goals. People also need to consider the cost and availability of different foods.  In general, experts recommend a diet that:   Includes lots of vegetables, fruits, beans, nuts, and whole grains   Limits red and processed meats, unhealthy fats, sugar, salt, and alcohol  What are dietary patterns? -- A dietary \"pattern\" means generally eating certain types of foods while limiting others. Some people need to follow a specific dietary pattern because of their health needs. For example, if you have high blood pressure, your doctor might recommend a diet low in salt.  If you are trying to improve your health in general, choosing a healthy dietary pattern can help. This does not have to mean being very strict about what you eat or avoid. The goal is to think about getting plenty of healthy foods while limiting less healthy foods.  Examples of dietary patterns include:   Mediterranean diet - This involves eating a lot of fruits, vegetables, nuts, and whole grains, and uses olive oil instead of other fats. It also includes some fish, poultry, and dairy products, but not a lot of red meat. Following this diet can help your overall health, and might even lower your risk of having a stroke.   Plant-based diets - These patterns focus on vegetables, fruits, grains, beans, and nuts. They limit or avoid food that comes from " "animals, such as meat and dairy. There are different types of plant-based diets, including vegetarian and vegan.   Low-fat diet - A low-fat diet involves limiting calories from fat. This might help some people keep weight off if that is their goal, but it does not have many other health benefits. If you choose to follow a low-fat diet, it is also important to focus on getting lots of whole grains, legumes, fruits, and vegetables. Limit refined grains and sugar.   Low-cholesterol diet - Cholesterol is found in foods with a lot of saturated fat, like red meat, butter, and cheese. A low-cholesterol diet focuses on limiting the amount of cholesterol that you eat. Limiting the cholesterol in your diet can also help lower the amount of unhealthy fats that you eat.  Which foods are especially healthy? -- Foods that are especially healthy include:   Fruits and vegetables - Eating a diet with lots of fruits and vegetables can help prevent heart disease and stroke. It might also help prevent certain types of cancer. Try to eat fruits and vegetables at each meal and also for snacks. If you don't have fresh fruits and vegetables available, you can eat frozen or canned ones instead. Doctors recommend eating at least 5 servings of fruits or vegetables each day.   Whole grains - Whole-grain foods include 100 percent whole-wheat bread, steel cut oats, and whole-grain pasta. These are healthier than foods made with \"refined\" grains, like white bread and white rice. Eating lots of whole grains instead of refined grains has been shown to help with weight control. It can also lower the risk of several health problems, including colon cancer, heart disease, and diabetes. Doctors recommend that most people try to eat 5 to 8 servings of whole-grain, high-fiber foods each day.   Foods with fiber - Eating foods with a lot of fiber can help prevent heart disease and stroke. If you have type 2 diabetes, it can also help control your blood " "sugar. Foods that have a lot of fiber include vegetables, fruits, beans, nuts, oatmeal, and whole-grain breads and cereals. You can tell how much fiber is in a food by reading the nutrition label (figure 1). Doctors recommend that most people eat about 25 to 34 grams of fiber each day.   Foods with calcium and vitamin D - Babies, children, and adults need calcium and vitamin D to help keep their bones strong. Adults also need calcium and vitamin D to help prevent osteoporosis. Osteoporosis is a condition that causes bones to get \"thin\" and break more easily than usual. Different foods and drinks have calcium and vitamin D in them (figure 2). People who don't get enough calcium and vitamin D in their diet might need to take a supplement. Doctors recommend that most people have 2 to 3 servings of foods with calcium and vitamin D each day.   Foods with protein - Protein helps your muscles and bones stay strong. Healthy foods with a lot of protein include chicken, fish, eggs, beans, nuts, and soy products. Red meat also has a lot of protein, but it also contains fats, which can be unhealthy. Doctors recommend that most people try to eat about 5 servings of protein each day.   Healthy fats - There are different types of fats. Some types of fats are better for your body than others. Healthy fats are \"monounsaturated\" or \"polyunsaturated\" fats. These are found in fatty fish, nuts and nut butters, and avocados. Use plant-based oils when cooking. Examples of these oils include olive, canola, safflower, sunflower, and corn oil. Eating foods with healthy fats, while avoiding or limiting foods with unhealthy fats, might lower the risk of heart disease.   Foods with folate - Folate is a vitamin that is important for pregnant people, since it helps prevent certain birth defects. It is also called \"folic acid.\" Anyone who could get pregnant should get at least 400 micrograms of folic acid daily, whether or not they are actively " "trying to get pregnant. Folate is found in many breakfast cereals, oranges, orange juice, and green leafy vegetables.  What foods should I avoid or limit? -- To eat a healthy diet, there are some things that you should avoid or limit. They include:   Unhealthy fats - \"Trans\" fats are especially unhealthy. They are found in margarines, many fast foods, and some store-bought baked goods. \"Saturated\" fats are found in animal products like meats, egg yolks, butter, cheese, and full-fat milk products. Unhealthy fats can raise your cholesterol level and increase your chance of getting heart disease.   Sugar - To have a healthy diet, it's important to limit or avoid added sugar, sweets, and refined grains. Refined grains are found in white bread, white rice, most pastas, and most packaged \"snack\" foods.  Avoiding sugar-sweetened beverages, like soda and sports drinks, can also help improve your health.  Avoid canned fruits in \"heavy\" syrup.   Red and processed meats - Studies have shown that eating a lot of red meat can increase your risk of certain health problems, including heart disease and cancer. You should limit the amount of red meat that you eat. This is also true for processed meats like sausage, hot dogs, and sultana.  Can I drink alcohol as part of a healthy diet? -- Not drinking alcohol at all is the healthiest choice. Regular drinking can raise a person's chances of getting liver disease and certain types of cancers. In females, even 1 drink a day can increase the risk of getting breast cancer.  If you do choose to drink, most doctors recommend limiting alcohol to no more than:   1 drink a day for females   2 drinks a day for males  The limits are different because, generally, the female body takes longer to break down alcohol.  How many calories do I need each day? -- Calories give your body energy. The number of calories that you need each day depends on your weight, height, age, sex, and how active you " "are.  Your doctor or nurse can tell you about how many calories you should eat each day. You can also work with a dietitian (nutrition expert) to learn more about your dietary needs and options.  What if I am having trouble improving my diet? -- It can be hard to change the way that you eat. Remember that even small changes can improve your health.  Here are some tips that might help:   Try to make fruits and vegetables part of every meal. If you don't have fresh fruits and vegetables, frozen or canned are good options. Look for products without added salt or sugar.   Keep a bowl of fruit out for snacking.   When you can, choose whole grains instead of refined grains. Choose chicken, fish, and beans instead of red meat and cheese.   Try to eat prepared and processed foods less often.   Try flavored seltzer or water instead of soda or juice.   When eating at fast food restaurants, look for healthier items, like broiled chicken or salad.  If you have questions about which foods you should or should not eat, ask your doctor, nurse, or dietitian. The right diet for you will depend, in part, on your health and any medical conditions you have.  All topics are updated as new evidence becomes available and our peer review process is complete.  This topic retrieved from [employer] on: Feb 28, 2024.  Topic 17567 Version 28.0  Release: 32.2.4 - C32.58  © 2024 UpToDate, Inc. and/or its affiliates. All rights reserved.  figure 1: Nutrition label - Fiber     This is an example of a nutrition label. To figure out how much fiber is in a food, look for the line that says \"Dietary Fiber.\" It's also important to look at the serving size. This food has 7 grams of fiber in each serving, and each serving is 1 cup.  Graphic 77200 Version 8.0  figure 2: Foods and drinks with calcium and vitamin D     Foods rich in calcium include ice cream, soy milk, breads, kale, broccoli, milk, cheese, cottage cheese, almonds, yogurt, ready-to-eat cereals, " "beans, and tofu. Foods rich in vitamin D include milk, fortified plant-based \"milks\" (soy, almond), canned tuna fish, cod liver oil, yogurt, ready-to-eat-cereals, cooked salmon, canned sardines, mackerel, and eggs. Some of these foods are rich in both.  Graphic 81150 Version 4.0  Consumer Information Use and Disclaimer   Disclaimer: This generalized information is a limited summary of diagnosis, treatment, and/or medication information. It is not meant to be comprehensive and should be used as a tool to help the user understand and/or assess potential diagnostic and treatment options. It does NOT include all information about conditions, treatments, medications, side effects, or risks that may apply to a specific patient. It is not intended to be medical advice or a substitute for the medical advice, diagnosis, or treatment of a health care provider based on the health care provider's examination and assessment of a patient's specific and unique circumstances. Patients must speak with a health care provider for complete information about their health, medical questions, and treatment options, including any risks or benefits regarding use of medications. This information does not endorse any treatments or medications as safe, effective, or approved for treating a specific patient. [employer], Inc[employer] and its affiliates disclaim any warranty or liability relating to this information or the use thereof.The use of this information is governed by the Terms of Use, available at https://www.woltersLoudClickuwer.com/en/know/clinical-effectiveness-terms. 2024© [employer], Inc. and its affiliates and/or licensors. All rights reserved.  Copyright   © 2024 Up[employer]Date, Inc. and/or its affiliates. All rights reserved.    Patient Education     Weight Loss Diet   About this topic   There are many \"trendy\" weight loss diets that are popular today. Many of these diets can end up being more harmful than helpful. The healthiest way to lose weight is " to burn more calories than you eat.  A weight loss diet should help you have a healthy view of eating. It is NOT healthy to stop eating to try and lose weight. A good diet plan will help you cut down your food intake and make healthy choices.  A healthy weight loss goal is 1 to 2 pounds (0.5 to 1 kg) per week. Reducing calories in your diet, burning calories through exercise, or both can help you lose weight. Combining a healthy diet with regular physical activity can help you get the best results.  To cut calories in your diet you can:  Switch from whole milk to 1% or skim milk.  Switch from regular cheese to low-fat or fat-free cheese.  Use healthier condiment choices:  Fat-free or low-fat sour cream or salad dressings  Spray butter  Diet syrups or jellies over regular  Try frozen yogurt as a dessert rather than eating ice cream.  Skip the chips. Snack on carrots, vegetables, or fruit. If chips are a favorite of yours, try the baked style and watch portion size.  Eat grilled, roasted, boiled, broiled, or baked meats. Avoid deep-frying. Choose skinless poultry, lean red meat, lean cuts of pork, and fish for good protein sources.  Try flavored no-calorie [place]. Do not drink soda and juices that have many calories.  Choose fruit instead of sweets.  General   Eating smaller meals more often may be helpful. This will keep you from overeating at your next meal. Also, eating meals slowly helps you feel full faster.  If eating 3 meals is a part of your lifestyle, choose more lean proteins and higher fiber foods to fill you up at each meal.  Do not skip meals. Most often if you skip a meal, you eat too much at the next meal.  Eat smaller portions. Use a smaller plate or bowl for meals, and when you are eating out, eat half and take the rest home.  Plan ahead. Plan your meals and grocery list before going to the store. Planning will keep you from getting meals from restaurants.  Do not go to the grocery store hungry. You  are more likely to buy snacks that are not good for you.  Portion out snacks. When you are having a snack, instead of grabbing the whole bag, portion a small amount out to give yourself a stopping point.  Drink water before and after your meals to help fill you up without the calories.  When eating starchy foods, choose whole-grain products. These have a lot of fiber which will make you feel full. Fiber also helps lower cholesterol and helps with bowel function.  If you need a helpful start, ask your doctor to send you to a dietitian for weight loss help.         What will the results be?   Losing excess weight will make your whole body healthier. You will have more energy for your daily activities and lower your risk for health problems.  What lifestyle changes are needed?   Stay active. Eating healthy is not always enough to lose weight. Burning calories by exercising is a big part of weight loss.  What foods are good to eat?   The key is to watch your portion sizes. It is best to choose foods that are lower in fat and calories.  Choose lean meats:  Boneless, skinless chicken breast  Pork loin  90% lean beef  Lean turkey meat  Fresh fish (not fried)  Choose low-fat dairy products:  1% or skim milk  Spray butter or margarine  Low-fat or fat-free cheese  Frozen yogurt or low-calorie ice cream  Choose fresh fruits, vegetables, beans and lentils, and whole wheat products more often.  Choose water to drink more often. Drink diet or no-calorie beverages when you want something other than water. Aim to get your calories from the foods you eat.  Choose smart snacks:  Fruits  Vegetables  Low-fat or nonfat yogurt  Low-fat or no-fat cheese, such as cottage cheese  Unsalted nuts  Hard-boiled egg  Hummus  Guacamole  Natural peanut butter  Popcorn with no butter - use pepper, garlic, or another spice to taste  Whole grain crackers  What foods should be limited or avoided?   Limit high-fat, high-sodium, and high-calorie foods  like:  Fried foods  Processed meats  Whole-fat dairy products  Candy, cookies, chips, pastries  Sausage, sultana, any full-fat meats  Soda, juice  Beer, wine, and mixed drinks (alcohol)  Will there be any other care needed?   What do I do first before trying to lose weight?  Talk to your doctor and dietitian to see if you need to lose weight. Work with them to set your weight loss goals.  If you have a chronic illness, such as high blood sugar or high blood pressure, ask a doctor or dietitian what diet and exercise is right for you.  Ask your doctor about how much you are able to exercise and what type of exercise is good for you.  Helpful tips   Keep a food journal to help keep you on track.  Join a support group.  Tips for burning calories:  If your workplace is near your house, choose to walk or bike to work instead of driving.  Take 20-minute walks each day. Walk around during your lunch break. You will not only burn calories, but will raise your energy for the rest of the day.  Take the stairs over the elevator.  Join a gym or exercise class with a friend.  Try to exercise 30 minutes a day for overall health. Three 10-minute sessions work too. Aim for 60 to 90 minutes a day to lose weight.  Drink lots of water before, during, and after exercise.  Last Reviewed Date   2021-06-24  Consumer Information Use and Disclaimer   This generalized information is a limited summary of diagnosis, treatment, and/or medication information. It is not meant to be comprehensive and should be used as a tool to help the user understand and/or assess potential diagnostic and treatment options. It does NOT include all information about conditions, treatments, medications, side effects, or risks that may apply to a specific patient. It is not intended to be medical advice or a substitute for the medical advice, diagnosis, or treatment of a health care provider based on the health care provider's examination and assessment of a patient’s  specific and unique circumstances. Patients must speak with a health care provider for complete information about their health, medical questions, and treatment options, including any risks or benefits regarding use of medications. This information does not endorse any treatments or medications as safe, effective, or approved for treating a specific patient. [employer], [employer] and its affiliates disclaim any warranty or liability relating to this information or the use thereof. The use of this information is governed by the Terms of Use, available at https://www.[employer]North [employer] Technologiesuwer.com/en/know/clinical-effectiveness-terms   Copyright   Copyright © 2024 [employer] and its affiliates and/or licensors. All rights [email or web address]